# Patient Record
Sex: MALE | Race: WHITE | NOT HISPANIC OR LATINO | Employment: OTHER | URBAN - METROPOLITAN AREA
[De-identification: names, ages, dates, MRNs, and addresses within clinical notes are randomized per-mention and may not be internally consistent; named-entity substitution may affect disease eponyms.]

---

## 2017-07-05 ENCOUNTER — APPOINTMENT (OUTPATIENT)
Dept: ADMISSIONS | Facility: MEDICAL CENTER | Age: 67
End: 2017-07-05
Attending: ORTHOPAEDIC SURGERY
Payer: COMMERCIAL

## 2017-07-05 RX ORDER — DICLOFENAC SODIUM 25 MG/1
25 TABLET, DELAYED RELEASE ORAL PRN
Status: ON HOLD | COMMUNITY
End: 2020-12-18

## 2017-07-05 RX ORDER — INSULIN GLARGINE 100 [IU]/ML
80 INJECTION, SOLUTION SUBCUTANEOUS 2 TIMES DAILY
COMMUNITY

## 2017-07-05 RX ORDER — PANTOPRAZOLE SODIUM 20 MG/1
20 TABLET, DELAYED RELEASE ORAL EVERY EVENING
COMMUNITY

## 2017-07-05 RX ORDER — ATORVASTATIN CALCIUM 80 MG/1
80 TABLET, FILM COATED ORAL DAILY
COMMUNITY

## 2017-07-05 RX ORDER — ERGOCALCIFEROL 1.25 MG/1
CAPSULE ORAL
Status: ON HOLD | COMMUNITY
End: 2020-12-15

## 2017-07-05 RX ORDER — GABAPENTIN 300 MG/1
900 CAPSULE ORAL 2 TIMES DAILY
COMMUNITY

## 2017-07-05 RX ORDER — MAGNESIUM OXIDE 420 MG/1
420 TABLET ORAL 2 TIMES DAILY
COMMUNITY

## 2017-07-05 RX ORDER — CARVEDILOL 25 MG/1
25 TABLET ORAL 2 TIMES DAILY WITH MEALS
COMMUNITY

## 2017-07-05 RX ORDER — LISINOPRIL 40 MG/1
40 TABLET ORAL DAILY
COMMUNITY

## 2017-07-05 RX ORDER — AMLODIPINE BESYLATE 2.5 MG/1
2.5 TABLET ORAL EVERY EVENING
COMMUNITY

## 2017-07-05 NOTE — OR NURSING
"Preadmit appointment: \" Preparing for your Procedure information\" sheet given to patient with verbal and written instructions. Patient instructed to continue prescribed medications through the day before surgery, instructed to take the following medications the day of surgery per anesthesia protocol: Amlodipine, Carvedilol, Gabapentin;  Pt instructed to bring CPAP day of surgery. Notified Dr. Devlin of admit, BMI and health summary  "

## 2017-07-06 NOTE — OR NURSING
Call placed to Dr. Ahmadi office and message left for Summer regarding if Dr. Ahmadi wanted to sent orders for preop testing for patient to have done near his home or wait and have labs/ekg on admit?  Left message that pt would need a BMP and EKG for anesthesia protocol, Also [facility] one month ago in addition [facility] to [facility].

## 2017-07-12 ENCOUNTER — HOSPITAL ENCOUNTER (OUTPATIENT)
Facility: MEDICAL CENTER | Age: 67
End: 2017-07-12
Attending: ORTHOPAEDIC SURGERY | Admitting: ORTHOPAEDIC SURGERY
Payer: COMMERCIAL

## 2017-07-12 VITALS
RESPIRATION RATE: 16 BRPM | TEMPERATURE: 96.8 F | HEIGHT: 70 IN | WEIGHT: 297.62 LBS | OXYGEN SATURATION: 97 % | BODY MASS INDEX: 42.61 KG/M2 | HEART RATE: 68 BPM

## 2017-07-12 PROBLEM — M19.232: Status: ACTIVE | Noted: 2017-07-12

## 2017-07-12 LAB
ANION GAP SERPL CALC-SCNC: 12 MMOL/L (ref 0–11.9)
BUN SERPL-MCNC: 19 MG/DL (ref 8–22)
CALCIUM SERPL-MCNC: 9.2 MG/DL (ref 8.4–10.2)
CHLORIDE SERPL-SCNC: 105 MMOL/L (ref 96–112)
CO2 SERPL-SCNC: 23 MMOL/L (ref 20–33)
CREAT SERPL-MCNC: 1.33 MG/DL (ref 0.5–1.4)
EKG IMPRESSION: NORMAL
GFR SERPL CREATININE-BSD FRML MDRD: 54 ML/MIN/1.73 M 2
GLUCOSE SERPL-MCNC: 248 MG/DL (ref 65–99)
POTASSIUM SERPL-SCNC: 4.1 MMOL/L (ref 3.6–5.5)
SODIUM SERPL-SCNC: 140 MMOL/L (ref 135–145)

## 2017-07-12 PROCEDURE — 80048 BASIC METABOLIC PNL TOTAL CA: CPT

## 2017-07-12 PROCEDURE — 93010 ELECTROCARDIOGRAM REPORT: CPT | Performed by: INTERNAL MEDICINE

## 2017-07-12 PROCEDURE — 700111 HCHG RX REV CODE 636 W/ 250 OVERRIDE (IP)

## 2017-07-12 PROCEDURE — 700101 HCHG RX REV CODE 250

## 2017-07-12 PROCEDURE — 302135 SEQUENTIAL COMPRESSION MACHINE: Performed by: ORTHOPAEDIC SURGERY

## 2017-07-12 PROCEDURE — 93005 ELECTROCARDIOGRAM TRACING: CPT | Performed by: ORTHOPAEDIC SURGERY

## 2017-07-12 RX ORDER — LIDOCAINE HYDROCHLORIDE 10 MG/ML
INJECTION, SOLUTION INFILTRATION; PERINEURAL
Status: COMPLETED
Start: 2017-07-12 | End: 2017-07-12

## 2017-07-12 RX ORDER — SODIUM CHLORIDE 9 MG/ML
INJECTION, SOLUTION INTRAVENOUS CONTINUOUS
Status: DISCONTINUED | OUTPATIENT
Start: 2017-07-12 | End: 2017-07-12 | Stop reason: HOSPADM

## 2017-07-12 RX ORDER — SODIUM CHLORIDE, SODIUM LACTATE, POTASSIUM CHLORIDE, CALCIUM CHLORIDE 600; 310; 30; 20 MG/100ML; MG/100ML; MG/100ML; MG/100ML
1000 INJECTION, SOLUTION INTRAVENOUS
Status: DISCONTINUED | OUTPATIENT
Start: 2017-07-12 | End: 2017-07-12 | Stop reason: CLARIF

## 2017-07-12 RX ADMIN — LIDOCAINE HYDROCHLORIDE 0.1 ML: 10 INJECTION, SOLUTION INFILTRATION; PERINEURAL at 06:30

## 2017-07-12 ASSESSMENT — PAIN SCALES - GENERAL: PAINLEVEL_OUTOF10: 0

## 2017-07-12 NOTE — OR NURSING
0555 pt to [facility]. Pre op EKG shows A fib. Dr. Weaver made aware on her arrival at 0705.  Condition discussed with Dr. Ahmadi .  Plan to  Cancel sx as new onset A fib needs cardiac work up prior to this elective sx.  Pt compliant w POC. DC'd stable to spouse's care, [facility] with [facility] of [facility] & progress note from Dr. Weaver.

## 2017-07-12 NOTE — PROGRESS NOTES
"Anesthesiology Day-of-Surgery Cancellation note    Patient presents for elective wrist fusion. He has a significant cardiac history, including an MI in 2005 that resulted in 3 stents. [facility] and saw him \"about 6 months ago\" and told him things looked good and to follow up in 1 year.  He also thinks he had a stress test a few months ago but doesn't believe the test showed anything bad.  Unfortunately, today in preop, he has new onset A-fib, which he denies ever having before.  He has otherwise normal VS currently. He denies any current or recent CP/SOB, but does report feeling fatigued and has had several episodes of dizziness and near-syncope for at least a month.      A/P:  - Unfortunately we need to cancel today's surgery with the concern that the A-fib is related to recent or ongoing cardiac ischemia.  We recommend [facility] today so that we can expedite his cardiac work up for his new atrial fibrillation. We look forward to continuing with the surgery when he is medically stable/optimized from a cardiac standpoint.     Saira Weaver MD  [facility]  "

## 2020-12-09 ENCOUNTER — PRE-ADMISSION TESTING (OUTPATIENT)
Dept: ADMISSIONS | Facility: MEDICAL CENTER | Age: 70
DRG: 460 | End: 2020-12-09
Attending: NEUROLOGICAL SURGERY
Payer: COMMERCIAL

## 2020-12-09 ENCOUNTER — HOSPITAL ENCOUNTER (OUTPATIENT)
Dept: RADIOLOGY | Facility: MEDICAL CENTER | Age: 70
DRG: 460 | End: 2020-12-09
Attending: NEUROLOGICAL SURGERY | Admitting: NEUROLOGICAL SURGERY
Payer: COMMERCIAL

## 2020-12-09 DIAGNOSIS — Z01.812 PRE-OPERATIVE LABORATORY EXAMINATION: ICD-10-CM

## 2020-12-09 DIAGNOSIS — Z01.811 PRE-OPERATIVE RESPIRATORY EXAMINATION: ICD-10-CM

## 2020-12-09 DIAGNOSIS — Z01.810 PRE-OPERATIVE CARDIOVASCULAR EXAMINATION: ICD-10-CM

## 2020-12-09 LAB
ANION GAP SERPL CALC-SCNC: 8 MMOL/L (ref 7–16)
APPEARANCE UR: CLEAR
APTT PPP: 38.2 SEC (ref 24.7–36)
BASOPHILS # BLD AUTO: 0.5 % (ref 0–1.8)
BASOPHILS # BLD: 0.05 K/UL (ref 0–0.12)
BILIRUB UR QL STRIP.AUTO: NEGATIVE
BUN SERPL-MCNC: 17 MG/DL (ref 8–22)
CALCIUM SERPL-MCNC: 9.8 MG/DL (ref 8.5–10.5)
CHLORIDE SERPL-SCNC: 104 MMOL/L (ref 96–112)
CO2 SERPL-SCNC: 25 MMOL/L (ref 20–33)
COLOR UR: YELLOW
COVID ORDER STATUS COVID19: NORMAL
CREAT SERPL-MCNC: 1.07 MG/DL (ref 0.5–1.4)
EKG IMPRESSION: NORMAL
EOSINOPHIL # BLD AUTO: 0.21 K/UL (ref 0–0.51)
EOSINOPHIL NFR BLD: 2 % (ref 0–6.9)
ERYTHROCYTE [DISTWIDTH] IN BLOOD BY AUTOMATED COUNT: 46.4 FL (ref 35.9–50)
EST. AVERAGE GLUCOSE BLD GHB EST-MCNC: 212 MG/DL
GLUCOSE SERPL-MCNC: 202 MG/DL (ref 65–99)
GLUCOSE UR STRIP.AUTO-MCNC: NEGATIVE MG/DL
HBA1C MFR BLD: 9 % (ref 0–5.6)
HCT VFR BLD AUTO: 42.1 % (ref 42–52)
HGB BLD-MCNC: 13.4 G/DL (ref 14–18)
IMM GRANULOCYTES # BLD AUTO: 0.03 K/UL (ref 0–0.11)
IMM GRANULOCYTES NFR BLD AUTO: 0.3 % (ref 0–0.9)
INR PPP: 1.27 (ref 0.87–1.13)
KETONES UR STRIP.AUTO-MCNC: ABNORMAL MG/DL
LEUKOCYTE ESTERASE UR QL STRIP.AUTO: NEGATIVE
LYMPHOCYTES # BLD AUTO: 3.37 K/UL (ref 1–4.8)
LYMPHOCYTES NFR BLD: 32.2 % (ref 22–41)
MCH RBC QN AUTO: 30.5 PG (ref 27–33)
MCHC RBC AUTO-ENTMCNC: 31.8 G/DL (ref 33.7–35.3)
MCV RBC AUTO: 95.7 FL (ref 81.4–97.8)
MICRO URNS: ABNORMAL
MONOCYTES # BLD AUTO: 0.92 K/UL (ref 0–0.85)
MONOCYTES NFR BLD AUTO: 8.8 % (ref 0–13.4)
NEUTROPHILS # BLD AUTO: 5.89 K/UL (ref 1.82–7.42)
NEUTROPHILS NFR BLD: 56.2 % (ref 44–72)
NITRITE UR QL STRIP.AUTO: NEGATIVE
NRBC # BLD AUTO: 0 K/UL
NRBC BLD-RTO: 0 /100 WBC
PH UR STRIP.AUTO: 5 [PH] (ref 5–8)
PLATELET # BLD AUTO: 241 K/UL (ref 164–446)
PMV BLD AUTO: 10.9 FL (ref 9–12.9)
POTASSIUM SERPL-SCNC: 5.1 MMOL/L (ref 3.6–5.5)
PROT UR QL STRIP: NEGATIVE MG/DL
PROTHROMBIN TIME: 16.3 SEC (ref 12–14.6)
RBC # BLD AUTO: 4.4 M/UL (ref 4.7–6.1)
RBC UR QL AUTO: NEGATIVE
SARS-COV-2 RNA RESP QL NAA+PROBE: NOTDETECTED
SODIUM SERPL-SCNC: 137 MMOL/L (ref 135–145)
SP GR UR STRIP.AUTO: 1.02
SPECIMEN SOURCE: NORMAL
UROBILINOGEN UR STRIP.AUTO-MCNC: 0.2 MG/DL
WBC # BLD AUTO: 10.5 K/UL (ref 4.8–10.8)

## 2020-12-09 PROCEDURE — 83036 HEMOGLOBIN GLYCOSYLATED A1C: CPT

## 2020-12-09 PROCEDURE — 71045 X-RAY EXAM CHEST 1 VIEW: CPT

## 2020-12-09 PROCEDURE — 81003 URINALYSIS AUTO W/O SCOPE: CPT

## 2020-12-09 PROCEDURE — 36415 COLL VENOUS BLD VENIPUNCTURE: CPT

## 2020-12-09 PROCEDURE — 85730 THROMBOPLASTIN TIME PARTIAL: CPT

## 2020-12-09 PROCEDURE — 85610 PROTHROMBIN TIME: CPT

## 2020-12-09 PROCEDURE — U0003 INFECTIOUS AGENT DETECTION BY NUCLEIC ACID (DNA OR RNA); SEVERE ACUTE RESPIRATORY SYNDROME CORONAVIRUS 2 (SARS-COV-2) (CORONAVIRUS DISEASE [COVID-19]), AMPLIFIED PROBE TECHNIQUE, MAKING USE OF HIGH THROUGHPUT TECHNOLOGIES AS DESCRIBED BY CMS-2020-01-R: HCPCS

## 2020-12-09 PROCEDURE — 93010 ELECTROCARDIOGRAM REPORT: CPT | Performed by: INTERNAL MEDICINE

## 2020-12-09 PROCEDURE — 85025 COMPLETE CBC W/AUTO DIFF WBC: CPT

## 2020-12-09 PROCEDURE — C9803 HOPD COVID-19 SPEC COLLECT: HCPCS

## 2020-12-09 PROCEDURE — 93005 ELECTROCARDIOGRAM TRACING: CPT

## 2020-12-09 PROCEDURE — 80048 BASIC METABOLIC PNL TOTAL CA: CPT

## 2020-12-09 RX ORDER — DULOXETIN HYDROCHLORIDE 60 MG/1
60 CAPSULE, DELAYED RELEASE ORAL DAILY
COMMUNITY

## 2020-12-09 RX ORDER — DIGOXIN 125 MCG
125 TABLET ORAL DAILY
COMMUNITY

## 2020-12-09 RX ORDER — ZOLPIDEM TARTRATE 10 MG/1
5 TABLET ORAL NIGHTLY PRN
COMMUNITY

## 2020-12-15 ENCOUNTER — ANESTHESIA EVENT (OUTPATIENT)
Dept: SURGERY | Facility: MEDICAL CENTER | Age: 70
DRG: 460 | End: 2020-12-15
Payer: COMMERCIAL

## 2020-12-15 ENCOUNTER — APPOINTMENT (OUTPATIENT)
Dept: RADIOLOGY | Facility: MEDICAL CENTER | Age: 70
DRG: 460 | End: 2020-12-15
Attending: NEUROLOGICAL SURGERY
Payer: COMMERCIAL

## 2020-12-15 ENCOUNTER — HOSPITAL ENCOUNTER (INPATIENT)
Facility: MEDICAL CENTER | Age: 70
LOS: 4 days | DRG: 460 | End: 2020-12-19
Attending: NEUROLOGICAL SURGERY | Admitting: NEUROLOGICAL SURGERY
Payer: COMMERCIAL

## 2020-12-15 ENCOUNTER — ANESTHESIA (OUTPATIENT)
Dept: SURGERY | Facility: MEDICAL CENTER | Age: 70
DRG: 460 | End: 2020-12-15
Payer: COMMERCIAL

## 2020-12-15 DIAGNOSIS — G89.18 ACUTE POST-OPERATIVE PAIN: ICD-10-CM

## 2020-12-15 LAB
EKG IMPRESSION: NORMAL
GLUCOSE BLD-MCNC: 120 MG/DL (ref 65–99)
GLUCOSE BLD-MCNC: 132 MG/DL (ref 65–99)
GLUCOSE BLD-MCNC: 163 MG/DL (ref 65–99)

## 2020-12-15 PROCEDURE — 501838 HCHG SUTURE GENERAL: Performed by: NEUROLOGICAL SURGERY

## 2020-12-15 PROCEDURE — 93010 ELECTROCARDIOGRAM REPORT: CPT | Mod: 26 | Performed by: INTERNAL MEDICINE

## 2020-12-15 PROCEDURE — 160048 HCHG OR STATISTICAL LEVEL 1-5: Performed by: NEUROLOGICAL SURGERY

## 2020-12-15 PROCEDURE — 700101 HCHG RX REV CODE 250: Performed by: ANESTHESIOLOGY

## 2020-12-15 PROCEDURE — 700102 HCHG RX REV CODE 250 W/ 637 OVERRIDE(OP): Performed by: ANESTHESIOLOGY

## 2020-12-15 PROCEDURE — 700105 HCHG RX REV CODE 258: Performed by: ANESTHESIOLOGY

## 2020-12-15 PROCEDURE — 700111 HCHG RX REV CODE 636 W/ 250 OVERRIDE (IP): Performed by: NEUROLOGICAL SURGERY

## 2020-12-15 PROCEDURE — 00NY3ZZ RELEASE LUMBAR SPINAL CORD, PERCUTANEOUS APPROACH: ICD-10-PCS | Performed by: NEUROLOGICAL SURGERY

## 2020-12-15 PROCEDURE — A9270 NON-COVERED ITEM OR SERVICE: HCPCS | Performed by: NEUROLOGICAL SURGERY

## 2020-12-15 PROCEDURE — A9270 NON-COVERED ITEM OR SERVICE: HCPCS | Performed by: ANESTHESIOLOGY

## 2020-12-15 PROCEDURE — A9270 NON-COVERED ITEM OR SERVICE: HCPCS | Performed by: PHYSICIAN ASSISTANT

## 2020-12-15 PROCEDURE — C1713 ANCHOR/SCREW BN/BN,TIS/BN: HCPCS | Performed by: NEUROLOGICAL SURGERY

## 2020-12-15 PROCEDURE — 700111 HCHG RX REV CODE 636 W/ 250 OVERRIDE (IP): Performed by: PHYSICIAN ASSISTANT

## 2020-12-15 PROCEDURE — 0SG03AJ FUSION OF LUMBAR VERTEBRAL JOINT WITH INTERBODY FUSION DEVICE, POSTERIOR APPROACH, ANTERIOR COLUMN, PERCUTANEOUS APPROACH: ICD-10-PCS | Performed by: NEUROLOGICAL SURGERY

## 2020-12-15 PROCEDURE — 500367 HCHG DRAIN KIT, HEMOVAC: Performed by: NEUROLOGICAL SURGERY

## 2020-12-15 PROCEDURE — 82962 GLUCOSE BLOOD TEST: CPT

## 2020-12-15 PROCEDURE — 700102 HCHG RX REV CODE 250 W/ 637 OVERRIDE(OP): Performed by: PHYSICIAN ASSISTANT

## 2020-12-15 PROCEDURE — 160035 HCHG PACU - 1ST 60 MINS PHASE I: Performed by: NEUROLOGICAL SURGERY

## 2020-12-15 PROCEDURE — 700101 HCHG RX REV CODE 250: Performed by: PHYSICIAN ASSISTANT

## 2020-12-15 PROCEDURE — 160036 HCHG PACU - EA ADDL 30 MINS PHASE I: Performed by: NEUROLOGICAL SURGERY

## 2020-12-15 PROCEDURE — 160009 HCHG ANES TIME/MIN: Performed by: NEUROLOGICAL SURGERY

## 2020-12-15 PROCEDURE — 160042 HCHG SURGERY MINUTES - EA ADDL 1 MIN LEVEL 5: Performed by: NEUROLOGICAL SURGERY

## 2020-12-15 PROCEDURE — 770006 HCHG ROOM/CARE - MED/SURG/GYN SEMI*

## 2020-12-15 PROCEDURE — 501411 HCHG SPONGE, BABY LAP W/O RINGS: Performed by: NEUROLOGICAL SURGERY

## 2020-12-15 PROCEDURE — 160002 HCHG RECOVERY MINUTES (STAT): Performed by: NEUROLOGICAL SURGERY

## 2020-12-15 PROCEDURE — 72100 X-RAY EXAM L-S SPINE 2/3 VWS: CPT

## 2020-12-15 PROCEDURE — 01NB3ZZ RELEASE LUMBAR NERVE, PERCUTANEOUS APPROACH: ICD-10-PCS | Performed by: NEUROLOGICAL SURGERY

## 2020-12-15 PROCEDURE — 700111 HCHG RX REV CODE 636 W/ 250 OVERRIDE (IP): Performed by: ANESTHESIOLOGY

## 2020-12-15 PROCEDURE — 700101 HCHG RX REV CODE 250: Performed by: NEUROLOGICAL SURGERY

## 2020-12-15 PROCEDURE — 93005 ELECTROCARDIOGRAM TRACING: CPT | Performed by: PHYSICIAN ASSISTANT

## 2020-12-15 PROCEDURE — 110454 HCHG SHELL REV 250: Performed by: NEUROLOGICAL SURGERY

## 2020-12-15 PROCEDURE — 500374 HCHG DRAIN, J-P FLAT 7MM FULL: Performed by: NEUROLOGICAL SURGERY

## 2020-12-15 PROCEDURE — 502240 HCHG MISC OR SUPPLY RC 0272: Performed by: NEUROLOGICAL SURGERY

## 2020-12-15 PROCEDURE — 0ST20ZZ RESECTION OF LUMBAR VERTEBRAL DISC, OPEN APPROACH: ICD-10-PCS | Performed by: NEUROLOGICAL SURGERY

## 2020-12-15 PROCEDURE — 500885 HCHG PACK, JACKSON TABLE: Performed by: NEUROLOGICAL SURGERY

## 2020-12-15 PROCEDURE — 502000 HCHG MISC OR IMPLANTS RC 0278: Performed by: NEUROLOGICAL SURGERY

## 2020-12-15 PROCEDURE — 160031 HCHG SURGERY MINUTES - 1ST 30 MINS LEVEL 5: Performed by: NEUROLOGICAL SURGERY

## 2020-12-15 PROCEDURE — 700105 HCHG RX REV CODE 258: Performed by: NEUROLOGICAL SURGERY

## 2020-12-15 DEVICE — PUTTY 10CC NANOBONE (1/EA): Type: IMPLANTABLE DEVICE | Site: BACK | Status: FUNCTIONAL

## 2020-12-15 DEVICE — SCREW SET SPINE NONSTERILE STREAMLINE: Type: IMPLANTABLE DEVICE | Site: BACK | Status: FUNCTIONAL

## 2020-12-15 RX ORDER — LABETALOL HYDROCHLORIDE 5 MG/ML
5 INJECTION, SOLUTION INTRAVENOUS
Status: DISCONTINUED | OUTPATIENT
Start: 2020-12-15 | End: 2020-12-15 | Stop reason: HOSPADM

## 2020-12-15 RX ORDER — AMLODIPINE BESYLATE 5 MG/1
2.5 TABLET ORAL EVERY EVENING
Status: DISCONTINUED | OUTPATIENT
Start: 2020-12-15 | End: 2020-12-19 | Stop reason: HOSPADM

## 2020-12-15 RX ORDER — AMOXICILLIN 250 MG
1 CAPSULE ORAL
Status: DISCONTINUED | OUTPATIENT
Start: 2020-12-15 | End: 2020-12-19 | Stop reason: HOSPADM

## 2020-12-15 RX ORDER — CALCIUM CARBONATE 500 MG/1
500 TABLET, CHEWABLE ORAL 2 TIMES DAILY
Status: DISCONTINUED | OUTPATIENT
Start: 2020-12-15 | End: 2020-12-19 | Stop reason: HOSPADM

## 2020-12-15 RX ORDER — HYDROMORPHONE HYDROCHLORIDE 1 MG/ML
0.1 INJECTION, SOLUTION INTRAMUSCULAR; INTRAVENOUS; SUBCUTANEOUS
Status: DISCONTINUED | OUTPATIENT
Start: 2020-12-15 | End: 2020-12-15 | Stop reason: HOSPADM

## 2020-12-15 RX ORDER — DEXTROSE MONOHYDRATE 25 G/50ML
50 INJECTION, SOLUTION INTRAVENOUS
Status: DISCONTINUED | OUTPATIENT
Start: 2020-12-15 | End: 2020-12-19 | Stop reason: HOSPADM

## 2020-12-15 RX ORDER — AMOXICILLIN 250 MG
1 CAPSULE ORAL NIGHTLY
Status: DISCONTINUED | OUTPATIENT
Start: 2020-12-15 | End: 2020-12-19 | Stop reason: HOSPADM

## 2020-12-15 RX ORDER — CARVEDILOL 6.25 MG/1
25 TABLET ORAL 2 TIMES DAILY WITH MEALS
Status: DISCONTINUED | OUTPATIENT
Start: 2020-12-15 | End: 2020-12-19 | Stop reason: HOSPADM

## 2020-12-15 RX ORDER — CEFAZOLIN SODIUM 1 G/3ML
INJECTION, POWDER, FOR SOLUTION INTRAMUSCULAR; INTRAVENOUS PRN
Status: DISCONTINUED | OUTPATIENT
Start: 2020-12-15 | End: 2020-12-15 | Stop reason: SURG

## 2020-12-15 RX ORDER — METOPROLOL TARTRATE 1 MG/ML
1 INJECTION, SOLUTION INTRAVENOUS
Status: DISCONTINUED | OUTPATIENT
Start: 2020-12-15 | End: 2020-12-15 | Stop reason: HOSPADM

## 2020-12-15 RX ORDER — ONDANSETRON 2 MG/ML
4 INJECTION INTRAMUSCULAR; INTRAVENOUS EVERY 4 HOURS PRN
Status: DISCONTINUED | OUTPATIENT
Start: 2020-12-15 | End: 2020-12-19 | Stop reason: HOSPADM

## 2020-12-15 RX ORDER — METHOCARBAMOL 750 MG/1
750 TABLET, FILM COATED ORAL EVERY 8 HOURS PRN
Status: DISCONTINUED | OUTPATIENT
Start: 2020-12-15 | End: 2020-12-19 | Stop reason: HOSPADM

## 2020-12-15 RX ORDER — BUPIVACAINE HYDROCHLORIDE AND EPINEPHRINE 5; 5 MG/ML; UG/ML
INJECTION, SOLUTION EPIDURAL; INTRACAUDAL; PERINEURAL
Status: DISCONTINUED | OUTPATIENT
Start: 2020-12-15 | End: 2020-12-15 | Stop reason: HOSPADM

## 2020-12-15 RX ORDER — ATORVASTATIN CALCIUM 40 MG/1
80 TABLET, FILM COATED ORAL DAILY
Status: DISCONTINUED | OUTPATIENT
Start: 2020-12-15 | End: 2020-12-19 | Stop reason: HOSPADM

## 2020-12-15 RX ORDER — DIGOXIN 125 MCG
125 TABLET ORAL DAILY
Status: DISCONTINUED | OUTPATIENT
Start: 2020-12-15 | End: 2020-12-19 | Stop reason: HOSPADM

## 2020-12-15 RX ORDER — HALOPERIDOL 5 MG/ML
1 INJECTION INTRAMUSCULAR
Status: DISCONTINUED | OUTPATIENT
Start: 2020-12-15 | End: 2020-12-15 | Stop reason: HOSPADM

## 2020-12-15 RX ORDER — OXYCODONE HYDROCHLORIDE 10 MG/1
10 TABLET ORAL
Status: DISCONTINUED | OUTPATIENT
Start: 2020-12-15 | End: 2020-12-19 | Stop reason: HOSPADM

## 2020-12-15 RX ORDER — DULOXETIN HYDROCHLORIDE 60 MG/1
60 CAPSULE, DELAYED RELEASE ORAL DAILY
Status: DISCONTINUED | OUTPATIENT
Start: 2020-12-15 | End: 2020-12-19 | Stop reason: HOSPADM

## 2020-12-15 RX ORDER — LISINOPRIL 20 MG/1
40 TABLET ORAL DAILY
Status: DISCONTINUED | OUTPATIENT
Start: 2020-12-15 | End: 2020-12-19 | Stop reason: HOSPADM

## 2020-12-15 RX ORDER — ONDANSETRON 2 MG/ML
4 INJECTION INTRAMUSCULAR; INTRAVENOUS
Status: COMPLETED | OUTPATIENT
Start: 2020-12-15 | End: 2020-12-15

## 2020-12-15 RX ORDER — OMEPRAZOLE 20 MG/1
20 CAPSULE, DELAYED RELEASE ORAL DAILY
Status: DISCONTINUED | OUTPATIENT
Start: 2020-12-15 | End: 2020-12-19 | Stop reason: HOSPADM

## 2020-12-15 RX ORDER — SODIUM CHLORIDE, SODIUM LACTATE, POTASSIUM CHLORIDE, CALCIUM CHLORIDE 600; 310; 30; 20 MG/100ML; MG/100ML; MG/100ML; MG/100ML
INJECTION, SOLUTION INTRAVENOUS
Status: DISCONTINUED | OUTPATIENT
Start: 2020-12-15 | End: 2020-12-15 | Stop reason: SURG

## 2020-12-15 RX ORDER — HYDROMORPHONE HYDROCHLORIDE 1 MG/ML
0.4 INJECTION, SOLUTION INTRAMUSCULAR; INTRAVENOUS; SUBCUTANEOUS
Status: DISCONTINUED | OUTPATIENT
Start: 2020-12-15 | End: 2020-12-15 | Stop reason: HOSPADM

## 2020-12-15 RX ORDER — HYDROMORPHONE HYDROCHLORIDE 1 MG/ML
0.2 INJECTION, SOLUTION INTRAMUSCULAR; INTRAVENOUS; SUBCUTANEOUS
Status: DISCONTINUED | OUTPATIENT
Start: 2020-12-15 | End: 2020-12-15 | Stop reason: HOSPADM

## 2020-12-15 RX ORDER — SODIUM CHLORIDE, SODIUM LACTATE, POTASSIUM CHLORIDE, CALCIUM CHLORIDE 600; 310; 30; 20 MG/100ML; MG/100ML; MG/100ML; MG/100ML
INJECTION, SOLUTION INTRAVENOUS CONTINUOUS
Status: DISCONTINUED | OUTPATIENT
Start: 2020-12-15 | End: 2020-12-15 | Stop reason: HOSPADM

## 2020-12-15 RX ORDER — GABAPENTIN 300 MG/1
900 CAPSULE ORAL 2 TIMES DAILY
Status: DISCONTINUED | OUTPATIENT
Start: 2020-12-15 | End: 2020-12-19 | Stop reason: HOSPADM

## 2020-12-15 RX ORDER — CEFAZOLIN SODIUM 2 G/100ML
2 INJECTION, SOLUTION INTRAVENOUS EVERY 8 HOURS
Status: COMPLETED | OUTPATIENT
Start: 2020-12-15 | End: 2020-12-15

## 2020-12-15 RX ORDER — ENEMA 19; 7 G/133ML; G/133ML
1 ENEMA RECTAL
Status: DISCONTINUED | OUTPATIENT
Start: 2020-12-15 | End: 2020-12-19 | Stop reason: HOSPADM

## 2020-12-15 RX ORDER — OXYCODONE HYDROCHLORIDE 5 MG/1
5 TABLET ORAL
Status: DISCONTINUED | OUTPATIENT
Start: 2020-12-15 | End: 2020-12-19 | Stop reason: HOSPADM

## 2020-12-15 RX ORDER — OXYCODONE HCL 5 MG/5 ML
5 SOLUTION, ORAL ORAL
Status: COMPLETED | OUTPATIENT
Start: 2020-12-15 | End: 2020-12-15

## 2020-12-15 RX ORDER — DIPHENHYDRAMINE HYDROCHLORIDE 50 MG/ML
25 INJECTION INTRAMUSCULAR; INTRAVENOUS EVERY 6 HOURS PRN
Status: DISCONTINUED | OUTPATIENT
Start: 2020-12-15 | End: 2020-12-19 | Stop reason: HOSPADM

## 2020-12-15 RX ORDER — SODIUM CHLORIDE, SODIUM LACTATE, POTASSIUM CHLORIDE, CALCIUM CHLORIDE 600; 310; 30; 20 MG/100ML; MG/100ML; MG/100ML; MG/100ML
INJECTION, SOLUTION INTRAVENOUS CONTINUOUS
Status: ACTIVE | OUTPATIENT
Start: 2020-12-15 | End: 2020-12-15

## 2020-12-15 RX ORDER — ONDANSETRON 4 MG/1
4 TABLET, ORALLY DISINTEGRATING ORAL EVERY 4 HOURS PRN
Status: DISCONTINUED | OUTPATIENT
Start: 2020-12-15 | End: 2020-12-19 | Stop reason: HOSPADM

## 2020-12-15 RX ORDER — DOCUSATE SODIUM 100 MG/1
100 CAPSULE, LIQUID FILLED ORAL 2 TIMES DAILY
Status: DISCONTINUED | OUTPATIENT
Start: 2020-12-15 | End: 2020-12-19 | Stop reason: HOSPADM

## 2020-12-15 RX ORDER — OXYCODONE HCL 5 MG/5 ML
10 SOLUTION, ORAL ORAL
Status: COMPLETED | OUTPATIENT
Start: 2020-12-15 | End: 2020-12-15

## 2020-12-15 RX ORDER — PANTOPRAZOLE SODIUM 20 MG/1
20 TABLET, DELAYED RELEASE ORAL EVERY EVENING
Status: DISCONTINUED | OUTPATIENT
Start: 2020-12-15 | End: 2020-12-15

## 2020-12-15 RX ORDER — ALPRAZOLAM 0.25 MG/1
0.25 TABLET ORAL 2 TIMES DAILY PRN
Status: DISCONTINUED | OUTPATIENT
Start: 2020-12-15 | End: 2020-12-19 | Stop reason: HOSPADM

## 2020-12-15 RX ORDER — POLYETHYLENE GLYCOL 3350 17 G/17G
1 POWDER, FOR SOLUTION ORAL 2 TIMES DAILY PRN
Status: DISCONTINUED | OUTPATIENT
Start: 2020-12-15 | End: 2020-12-19 | Stop reason: HOSPADM

## 2020-12-15 RX ORDER — ZOLPIDEM TARTRATE 5 MG/1
5 TABLET ORAL NIGHTLY PRN
Status: DISCONTINUED | OUTPATIENT
Start: 2020-12-15 | End: 2020-12-19 | Stop reason: HOSPADM

## 2020-12-15 RX ORDER — HYDROMORPHONE HYDROCHLORIDE 1 MG/ML
0.5 INJECTION, SOLUTION INTRAMUSCULAR; INTRAVENOUS; SUBCUTANEOUS
Status: DISCONTINUED | OUTPATIENT
Start: 2020-12-15 | End: 2020-12-19 | Stop reason: HOSPADM

## 2020-12-15 RX ORDER — PHENYLEPHRINE HCL IN 0.9% NACL 0.5 MG/5ML
SYRINGE (ML) INTRAVENOUS PRN
Status: DISCONTINUED | OUTPATIENT
Start: 2020-12-15 | End: 2020-12-15 | Stop reason: SURG

## 2020-12-15 RX ORDER — ONDANSETRON 2 MG/ML
INJECTION INTRAMUSCULAR; INTRAVENOUS PRN
Status: DISCONTINUED | OUTPATIENT
Start: 2020-12-15 | End: 2020-12-15 | Stop reason: SURG

## 2020-12-15 RX ORDER — CLONIDINE HYDROCHLORIDE 0.1 MG/1
0.1 TABLET ORAL EVERY 4 HOURS PRN
Status: DISCONTINUED | OUTPATIENT
Start: 2020-12-15 | End: 2020-12-19 | Stop reason: HOSPADM

## 2020-12-15 RX ORDER — SODIUM CHLORIDE AND POTASSIUM CHLORIDE 150; 900 MG/100ML; MG/100ML
INJECTION, SOLUTION INTRAVENOUS CONTINUOUS
Status: DISCONTINUED | OUTPATIENT
Start: 2020-12-15 | End: 2020-12-19 | Stop reason: HOSPADM

## 2020-12-15 RX ORDER — CEFAZOLIN SODIUM 1 G/3ML
INJECTION, POWDER, FOR SOLUTION INTRAMUSCULAR; INTRAVENOUS
Status: DISCONTINUED | OUTPATIENT
Start: 2020-12-15 | End: 2020-12-15 | Stop reason: HOSPADM

## 2020-12-15 RX ORDER — INSULIN GLARGINE 100 [IU]/ML
80 INJECTION, SOLUTION SUBCUTANEOUS 2 TIMES DAILY
Status: DISCONTINUED | OUTPATIENT
Start: 2020-12-15 | End: 2020-12-19 | Stop reason: HOSPADM

## 2020-12-15 RX ORDER — ERGOCALCIFEROL 1.25 MG/1
50000 CAPSULE ORAL
COMMUNITY

## 2020-12-15 RX ORDER — LIDOCAINE HYDROCHLORIDE 20 MG/ML
INJECTION, SOLUTION EPIDURAL; INFILTRATION; INTRACAUDAL; PERINEURAL PRN
Status: DISCONTINUED | OUTPATIENT
Start: 2020-12-15 | End: 2020-12-15 | Stop reason: SURG

## 2020-12-15 RX ORDER — BISACODYL 10 MG
10 SUPPOSITORY, RECTAL RECTAL
Status: DISCONTINUED | OUTPATIENT
Start: 2020-12-15 | End: 2020-12-19 | Stop reason: HOSPADM

## 2020-12-15 RX ORDER — DIPHENHYDRAMINE HCL 25 MG
25 TABLET ORAL EVERY 6 HOURS PRN
Status: DISCONTINUED | OUTPATIENT
Start: 2020-12-15 | End: 2020-12-19 | Stop reason: HOSPADM

## 2020-12-15 RX ORDER — VANCOMYCIN HYDROCHLORIDE 1 G/20ML
INJECTION, POWDER, LYOPHILIZED, FOR SOLUTION INTRAVENOUS
Status: COMPLETED | OUTPATIENT
Start: 2020-12-15 | End: 2020-12-15

## 2020-12-15 RX ADMIN — Medication 200 MG: at 07:12

## 2020-12-15 RX ADMIN — HYDROMORPHONE HYDROCHLORIDE 0.4 MG: 1 INJECTION, SOLUTION INTRAMUSCULAR; INTRAVENOUS; SUBCUTANEOUS at 10:00

## 2020-12-15 RX ADMIN — FENTANYL CITRATE 50 MCG: 50 INJECTION, SOLUTION INTRAMUSCULAR; INTRAVENOUS at 09:04

## 2020-12-15 RX ADMIN — Medication 100 MCG: at 07:37

## 2020-12-15 RX ADMIN — HYDROMORPHONE HYDROCHLORIDE 0.4 MG: 1 INJECTION, SOLUTION INTRAMUSCULAR; INTRAVENOUS; SUBCUTANEOUS at 11:22

## 2020-12-15 RX ADMIN — GABAPENTIN 900 MG: 300 CAPSULE ORAL at 17:53

## 2020-12-15 RX ADMIN — LIDOCAINE HYDROCHLORIDE 100 MG: 20 INJECTION, SOLUTION EPIDURAL; INFILTRATION; INTRACAUDAL at 07:12

## 2020-12-15 RX ADMIN — ROCURONIUM BROMIDE 50 MG: 10 INJECTION, SOLUTION INTRAVENOUS at 08:08

## 2020-12-15 RX ADMIN — CEFAZOLIN SODIUM 2 G: 2 INJECTION, SOLUTION INTRAVENOUS at 17:57

## 2020-12-15 RX ADMIN — INSULIN GLARGINE 80 UNITS: 100 INJECTION, SOLUTION SUBCUTANEOUS at 17:54

## 2020-12-15 RX ADMIN — FENTANYL CITRATE 50 MCG: 50 INJECTION, SOLUTION INTRAMUSCULAR; INTRAVENOUS at 10:06

## 2020-12-15 RX ADMIN — HYDROMORPHONE HYDROCHLORIDE 0.4 MG: 1 INJECTION, SOLUTION INTRAMUSCULAR; INTRAVENOUS; SUBCUTANEOUS at 10:45

## 2020-12-15 RX ADMIN — OXYCODONE HYDROCHLORIDE 10 MG: 10 TABLET ORAL at 23:08

## 2020-12-15 RX ADMIN — ONDANSETRON 4 MG: 2 INJECTION INTRAMUSCULAR; INTRAVENOUS at 10:07

## 2020-12-15 RX ADMIN — METFORMIN HYDROCHLORIDE 1000 MG: 500 TABLET ORAL at 17:53

## 2020-12-15 RX ADMIN — LIDOCAINE HYDROCHLORIDE 0.5 ML: 10 INJECTION, SOLUTION EPIDURAL; INFILTRATION; INTRACAUDAL; PERINEURAL at 06:27

## 2020-12-15 RX ADMIN — DIGOXIN 125 MCG: 125 TABLET ORAL at 15:54

## 2020-12-15 RX ADMIN — HYDROMORPHONE HYDROCHLORIDE 0.5 MG: 1 INJECTION, SOLUTION INTRAMUSCULAR; INTRAVENOUS; SUBCUTANEOUS at 18:38

## 2020-12-15 RX ADMIN — OXYCODONE HYDROCHLORIDE 10 MG: 10 TABLET ORAL at 13:40

## 2020-12-15 RX ADMIN — DULOXETINE HYDROCHLORIDE 60 MG: 60 CAPSULE, DELAYED RELEASE ORAL at 15:54

## 2020-12-15 RX ADMIN — PROPOFOL 200 MG: 10 INJECTION, EMULSION INTRAVENOUS at 07:12

## 2020-12-15 RX ADMIN — HYDROMORPHONE HYDROCHLORIDE 0.2 MG: 1 INJECTION, SOLUTION INTRAMUSCULAR; INTRAVENOUS; SUBCUTANEOUS at 11:08

## 2020-12-15 RX ADMIN — METHOCARBAMOL 750 MG: 750 TABLET ORAL at 18:34

## 2020-12-15 RX ADMIN — DOCUSATE SODIUM 50 MG AND SENNOSIDES 8.6 MG 1 TABLET: 8.6; 5 TABLET, FILM COATED ORAL at 20:46

## 2020-12-15 RX ADMIN — EPHEDRINE SULFATE 10 MG: 50 INJECTION, SOLUTION INTRAVENOUS at 07:37

## 2020-12-15 RX ADMIN — ALPRAZOLAM 0.25 MG: 0.25 TABLET ORAL at 11:22

## 2020-12-15 RX ADMIN — ANTACID TABLETS 500 MG: 500 TABLET, CHEWABLE ORAL at 17:53

## 2020-12-15 RX ADMIN — FENTANYL CITRATE 50 MCG: 50 INJECTION, SOLUTION INTRAMUSCULAR; INTRAVENOUS at 10:12

## 2020-12-15 RX ADMIN — Medication 100 MCG: at 07:24

## 2020-12-15 RX ADMIN — ATORVASTATIN CALCIUM 80 MG: 40 TABLET, FILM COATED ORAL at 15:54

## 2020-12-15 RX ADMIN — OMEPRAZOLE 20 MG: 20 CAPSULE, DELAYED RELEASE ORAL at 17:53

## 2020-12-15 RX ADMIN — METHOCARBAMOL 750 MG: 750 TABLET ORAL at 11:10

## 2020-12-15 RX ADMIN — SODIUM CHLORIDE, POTASSIUM CHLORIDE, SODIUM LACTATE AND CALCIUM CHLORIDE: 600; 310; 30; 20 INJECTION, SOLUTION INTRAVENOUS at 06:27

## 2020-12-15 RX ADMIN — AMLODIPINE BESYLATE 2.5 MG: 5 TABLET ORAL at 17:53

## 2020-12-15 RX ADMIN — FENTANYL CITRATE 150 MCG: 50 INJECTION, SOLUTION INTRAMUSCULAR; INTRAVENOUS at 07:12

## 2020-12-15 RX ADMIN — Medication 100 MCG: at 07:20

## 2020-12-15 RX ADMIN — HYDROMORPHONE HYDROCHLORIDE 0.4 MG: 1 INJECTION, SOLUTION INTRAMUSCULAR; INTRAVENOUS; SUBCUTANEOUS at 11:46

## 2020-12-15 RX ADMIN — CEFAZOLIN 3 G: 330 INJECTION, POWDER, FOR SOLUTION INTRAMUSCULAR; INTRAVENOUS at 07:12

## 2020-12-15 RX ADMIN — EPHEDRINE SULFATE 10 MG: 50 INJECTION, SOLUTION INTRAVENOUS at 07:24

## 2020-12-15 RX ADMIN — PHENYLEPHRINE HYDROCHLORIDE 60 MCG/MIN: 10 INJECTION INTRAVENOUS at 08:24

## 2020-12-15 RX ADMIN — OXYCODONE HYDROCHLORIDE 10 MG: 5 SOLUTION ORAL at 10:07

## 2020-12-15 RX ADMIN — POTASSIUM CHLORIDE AND SODIUM CHLORIDE: 900; 150 INJECTION, SOLUTION INTRAVENOUS at 17:57

## 2020-12-15 RX ADMIN — CEFAZOLIN SODIUM 2 G: 2 INJECTION, SOLUTION INTRAVENOUS at 23:08

## 2020-12-15 RX ADMIN — OXYCODONE HYDROCHLORIDE 10 MG: 10 TABLET ORAL at 17:53

## 2020-12-15 RX ADMIN — ONDANSETRON 4 MG: 2 INJECTION INTRAMUSCULAR; INTRAVENOUS at 09:28

## 2020-12-15 RX ADMIN — POVIDONE IODINE 15 ML: 100 SOLUTION TOPICAL at 06:27

## 2020-12-15 RX ADMIN — CARVEDILOL 25 MG: 6.25 TABLET, FILM COATED ORAL at 17:54

## 2020-12-15 RX ADMIN — SODIUM CHLORIDE, POTASSIUM CHLORIDE, SODIUM LACTATE AND CALCIUM CHLORIDE: 600; 310; 30; 20 INJECTION, SOLUTION INTRAVENOUS at 07:06

## 2020-12-15 RX ADMIN — EPHEDRINE SULFATE 10 MG: 50 INJECTION, SOLUTION INTRAVENOUS at 08:08

## 2020-12-15 RX ADMIN — ROCURONIUM BROMIDE 50 MG: 10 INJECTION, SOLUTION INTRAVENOUS at 07:12

## 2020-12-15 RX ADMIN — SUGAMMADEX 200 MG: 100 INJECTION, SOLUTION INTRAVENOUS at 09:40

## 2020-12-15 RX ADMIN — DOCUSATE SODIUM 100 MG: 100 CAPSULE ORAL at 17:54

## 2020-12-15 RX ADMIN — HYDROMORPHONE HYDROCHLORIDE 0.2 MG: 1 INJECTION, SOLUTION INTRAMUSCULAR; INTRAVENOUS; SUBCUTANEOUS at 11:55

## 2020-12-15 RX ADMIN — SODIUM CHLORIDE, POTASSIUM CHLORIDE, SODIUM LACTATE AND CALCIUM CHLORIDE: 600; 310; 30; 20 INJECTION, SOLUTION INTRAVENOUS at 11:25

## 2020-12-15 ASSESSMENT — LIFESTYLE VARIABLES
EVER FELT BAD OR GUILTY ABOUT YOUR DRINKING: NO
ALCOHOL_USE: NO
TOTAL SCORE: 0
HAVE PEOPLE ANNOYED YOU BY CRITICIZING YOUR DRINKING: NO
HAVE YOU EVER FELT YOU SHOULD CUT DOWN ON YOUR DRINKING: NO
DOES PATIENT WANT TO STOP DRINKING: NO
TOTAL SCORE: 0
CONSUMPTION TOTAL: NEGATIVE
TOTAL SCORE: 0
EVER HAD A DRINK FIRST THING IN THE MORNING TO STEADY YOUR NERVES TO GET RID OF A HANGOVER: NO
AVERAGE NUMBER OF DAYS PER WEEK YOU HAVE A DRINK CONTAINING ALCOHOL: 0
HOW MANY TIMES IN THE PAST YEAR HAVE YOU HAD 5 OR MORE DRINKS IN A DAY: 0
ON A TYPICAL DAY WHEN YOU DRINK ALCOHOL HOW MANY DRINKS DO YOU HAVE: 0

## 2020-12-15 ASSESSMENT — PAIN SCALES - GENERAL: PAIN_LEVEL: 4

## 2020-12-15 ASSESSMENT — PAIN DESCRIPTION - PAIN TYPE
TYPE: ACUTE PAIN;SURGICAL PAIN
TYPE: ACUTE PAIN
TYPE: ACUTE PAIN;SURGICAL PAIN
TYPE: ACUTE PAIN
TYPE: ACUTE PAIN
TYPE: ACUTE PAIN;SURGICAL PAIN
TYPE: ACUTE PAIN;SURGICAL PAIN
TYPE: ACUTE PAIN
TYPE: ACUTE PAIN;SURGICAL PAIN

## 2020-12-15 ASSESSMENT — PATIENT HEALTH QUESTIONNAIRE - PHQ9
1. LITTLE INTEREST OR PLEASURE IN DOING THINGS: NOT AT ALL
2. FEELING DOWN, DEPRESSED, IRRITABLE, OR HOPELESS: NOT AT ALL
SUM OF ALL RESPONSES TO PHQ9 QUESTIONS 1 AND 2: 0

## 2020-12-15 NOTE — OR NURSING
Pre op admission completed.  Pt educated on surgical plan of care, all questions answered.  Bed in low position and call light within reach.  Hourly rounding in place.  This RN performed excellent hand hygiene and wore a surgical mask at all times.  Pt wore a mask at all times except when doing oral and nasal triple aim care.    Pt uses a cpap at home with 2 L of oxygen.  Pt does not use oxygen with cpap.  Provider is B&B, pt does not have a portable tank.    Pt has a new plaque on left scapula.  Dr. Manzano's PA is aware.  No new orders.

## 2020-12-15 NOTE — ANESTHESIA TIME REPORT
Anesthesia Start and Stop Event Times     Date Time Event    12/15/2020 0649 Ready for Procedure     0706 Anesthesia Start     0956 Anesthesia Stop        Responsible Staff  12/15/20    Name Role Begin End    Royal Vaz D.O. Anesth 0706 0956        Preop Diagnosis (Free Text):  Pre-op Diagnosis     LUMBAR DEGENERATIVE DISC DISEASE AND STENOSIS        Preop Diagnosis (Codes):    Post op Diagnosis  Lumbar spinal stenosis      Premium Reason  Non-Premium    Comments:

## 2020-12-15 NOTE — OP REPORT
DATE OF SERVICE:  12/15/2020     SURGEON:  Alberto Aguirre MD     FIRST ASSISTANT:  Erin Champion PA-C     ANESTHESIOLOGIST:  Royal Vaz DO     ANESTHESIA:  GETA.     PREOPERATIVE DIAGNOSES:  1.  Lumbar spondylolisthesis, L4-L5.  2.  Lumbar degenerative disk disease with radiculopathy, L4-L5.  3.  Morbid obesity.  4.  Prior lumbar laminotomies, L4-L5.  5.  Connective tissue foraminal stenosis with radiculopathy, L4-L5.  6.  Low back pain, right lumbar radiculopathy.     POSTOPERATIVE DIAGNOSES:  1.  Lumbar spondylolisthesis, L4-L5.  2.  Lumbar degenerative disk disease with radiculopathy, L4-L5.  3.  Morbid obesity.  4.  Prior lumbar laminotomies, L4-L5.  5.  Connective tissue foraminal stenosis with radiculopathy, L4-L5.  6.  Low back pain, right lumbar radiculopathy.     PROCEDURES PERFORMED:  1.  Right L4 transpedicular approach for decompression of the right L4 nerve.  2.  Right L5 transpedicular approach for decompression of the right L5 nerve.  3.  Right L4-L5 transforaminal lumbar interbody fusion.  4.  Placement of a 4WEB titanium TLIF intervertebral biomechanical device,   9x26 mm high and long, 6-degree lordotic at L4-L5.  5.  Bilateral Life Spine pedicle screw placed in L4 and L5.  6.  Posterolateral arthrodesis/fusion L4-L5.  7.  Interbody arthrodesis/fusion L4-L5.  8.  Morselized allograft for the purpose of interbody and posterolateral   arthrodesis and fusion.  9.  Morselized allograft, NanoBone for the purpose of interbody and   posterolateral arthrodesis and fusion, L4-L5.  10.  Microscopic dissection.     INDICATIONS FOR PROCEDURE:  This is a 70-year-old  who had multiple   lumbar diskectomies dating back to the 1980s.  He presented to clinic with   progressive back pain, ambulatory intolerance, right greater than left lumbar   radiculopathies.  MRI showed severe degenerative disk disease with a large   degenerated and herniated disk at right L4-L5 with a grade I spondylolisthesis    at L4-L5, anterolisthesis.  Surgery was indicated for low back pain, right   lumbar radiculopathy, right lower extremity weakness, ambulatory intolerance   in the setting of severe degenerative disk disease with a large herniated   degenerative disk, grade I spondylolisthesis at L4-L5.  We discussed   decompression and stabilization given the severity of his findings and his   severity of neurologic disability.  He understood risks, benefits and   alternatives of procedure and wished to proceed.     DESCRIPTION OF PROCEDURE:  The patient was brought to the operating room and   intubated by the anesthesiologist.  He was placed prone on a Ramin OSI   table.  He was marked, prepped and draped in the usual sterile fashion.    Preprocedure timeout was performed and 0.5% Marcaine with epinephrine was   infiltrated in the skin.  Biplanar fluoroscopy was utilized to make bilateral   paramedian incisions over the L4 and L5 pedicles.  A PediGuard was used to   create pedicle trajectory tracts of the bilateral L4 and L5 pedicles.  K-wires   were placed.  Subsequent dilation was completed, tapping of the pedicle   trajectories and 6.5x50 mm screws were placed at left L4, 6.5x55 mm screw was   placed at left L5 pedicle on the right.  Screw shanks were placed at right L4   and right L5.  The minimally invasive spine retractor was placed over the   right-sided pedicle screws.  A blunt dissection was carried down through the   erector spinae muscles with the retractor docked onto the screw shanks over   the right L4 and right L5 pedicles and facet complexes.  A medial retractor   blade was attached.  Monopolar electrocautery was used to dissect down through   soft tissue and I was able to visualize the scar tissue and the remainder of   the right L4 and right L5 lamina.  Microscope was brought in the field.  High   speed Midas Jhoan drill was used to drill redo laminotomies right L4 right L5   all the way out to the medial facet  complexes, identifying the screw shanks   and hence for the right L4 and right L5 pedicles by proxy.  Bone chips were   saved with a Penfield 1, placed in a specimen cup.  Drilling proceeded down to   the scar tissue, which was able to be identified.  Angled curette was able to   separate the scar tissue from the dura and the remainder of the redo   laminotomy right L4 and right L5 was carried out with a 2 and subsequently 3   mm Kerrison.  This was completed superiorly from the right L4 pedicle down to   the right L5 pedicle inferiorly.  Complete medial facetectomy was completed.    The pars interarticularis was resected as well in its entirety.  There was   quite significant scar tissue and calcified degenerative herniated disk in the   right lateral recess.  All the scar tissue and herniated disk were removed in   their entirety.  There was still far lateral recess stenosis over the right   L4 and right L5 nerve roots due to the severity of the degenerative disk space   collapse.  In order to address the far lateral foraminal stenosis over the   right L4 and right L5 nerves, transpedicular approach was completed, drilling   down the medial and superior border of the right L5 pedicle and drilling down   the medial and inferior border of the right L4 pedicle to further clear the   right L4 and right L5 nerve roots respectively.  At this point, I was pleased   with the far lateral decompression, central decompression.  A Penfield 4 was   utilized to separate the dura from old scar tissue.  The dura was very gently   medialized with a nerve root retractor.  The L4-L5 disk space was identified   and incised with an 11 blade.  With the aid of lateral fluoroscopy, complete   diskectomy was performed with sequential blunt paddle dilation starting at 6   mm, going up to 10 mm.  A 10 mm paddle shaver was utilized in the L4-L5 disk   space to start preparation of the endplates.  Complete diskectomy was   performed with a  curette and pituitary.  The endplates were prepared with a   curette as well.  No endplate breaches were noted.  A 9 mm high trial was   noted to fit the space well, although a 10 mm would have been more ideal given   the intervertebral disk space, the posterior osteophytes prevented insertion   of the 10 mm trial without excessive force and obviously this was not   necessary and so a 9 mm trial was completed.  A bone funnel was used to place   morselized allograft and morselized autograft into the L4-L5 disk space.  A 9   mm permanent titanium _____ interbody graft was packed with a combination of   morselized allograft, NanoBone as well as morselized autograft from the   decompression and this was inserted into the L4-L5 disk space in the lordotic   position and tamped into place.  Fluoroscopy revealed good hardware placement   at the appropriate surgical level.  The screw shanks were removed out of the   right L4 and right L5 pedicle trajectories and the pedicle screws were placed,   these were both 6.5x55 mm screws.  Prebent 50 mm jamey was placed through the   tulip heads bilaterally at L4-L5, secured in place with locking cap mechanisms   and torqued to specification x4.  The percutaneous towers were removed.    Final AP and lateral fluoroscopy confirmed good hardware placement in the   appropriate surgical levels.  Wounds were copiously irrigated.  Surgiflo was   used in the epidural space for hemostasis.  At this point, the wounds were   essentially dry and so no drain was placed.  Two grams of vancomycin powder   were infiltrated between the wounds.  The wounds were meticulously closed in   layers.  Steri-Strips were applied to the skin edges.  Sterile dressing was   applied.  The patient was extubated in the operating room and taken to PACU in   stable condition.     ESTIMATED BLOOD LOSS:  50 mL.     COMPLICATIONS:  None noted.        ______________________________  Alberto Aguirre MD SA/RADHA    DD:   12/15/2020 09:57  DT:  12/15/2020 12:17    Job#:  [id number]

## 2020-12-15 NOTE — ANESTHESIA POSTPROCEDURE EVALUATION
Patient: Toney Watson Jr.    Procedure Summary     Date: 12/15/20 Room / Location: [place] [address] / SURGERY [facility]    Anesthesia Start: 0706 Anesthesia Stop: 0956    Procedure: FUSION, SPINE, LUMBAR, PLIF-L4-5 MINIMALLY INVASIVE TLIF WITH BILATERAL SCREWS (Right Back) Diagnosis: (LUMBAR DEGENERATIVE DISC DISEASE AND STENOSIS)    Surgeons: Alberto Aguirre M.D. Responsible Provider: Royal Vaz D.O.    Anesthesia Type: general ASA Status: 3          Final Anesthesia Type: general  Last vitals  BP   Blood Pressure : 118/49    Temp   36.1 °C (97 °F)    Pulse   Pulse: 62   Resp   14    SpO2   100 %      Anesthesia Post Evaluation    Patient location during evaluation: PACU  Patient participation: complete - patient participated  Level of consciousness: awake and alert  Pain score: 4    Airway patency: patent  Anesthetic complications: no  Cardiovascular status: hemodynamically stable  Respiratory status: acceptable  Hydration status: euvolemic    PONV: none           Nurse Pain Score: 4 (NPRS)

## 2020-12-15 NOTE — ANESTHESIA PREPROCEDURE EVALUATION
Relevant Problems   CARDIAC   (+) CAD (coronary artery disease)- hx LAD stent    (+) HTN (hypertension)      Other   (+) Obesity       Physical Exam    Airway   Mallampati: II  TM distance: >3 FB  Neck ROM: full       Cardiovascular   Rhythm: irregular  Rate: normal  (-) murmur  Comments: Afib - rate controlled   Dental - normal exam           Pulmonary - normal exam  Breath sounds clear to auscultation     Abdominal    Neurological - normal exam               Anesthesia Plan    ASA 3   ASA physical status 3 criteria: morbid obesity - BMI greater than or equal to 40    Plan - general       Airway plan will be ETT        Induction: intravenous    Postoperative Plan: Postoperative administration of opioids is intended.    Pertinent diagnostic labs and testing reviewed    Informed Consent:    Anesthetic plan and risks discussed with patient.    Use of blood products discussed with: patient whom consented to blood products.

## 2020-12-15 NOTE — OR SURGEON
Immediate Post OP Note    PreOp Diagnosis: L4-5 DDD, lumbar stenosis with radiculopathy    PostOp Diagnosis: Same    Procedure(s):  FUSION, SPINE, LUMBAR, PLIF-L4-5 MINIMALLY INVASIVE TLIF WITH BILATERAL SCREWS - Wound Class: Clean with Drain    Surgeon(s):  Alberto Aguirre M.D.    Anesthesiologist/Type of Anesthesia:  Anesthesiologist: Royal Vaz D.O./General    Surgical Staff:  Assistant: Erin Champion P.A.-C.  Circulator: Royal Atwood R.N.  Relief Circulator: Elsa Wilkinson R.N.  Scrub Person: Ronel Velez  Radiology Technologist: Molly Rodriguez    Specimens removed if any:  * No specimens in log *    Estimated Blood Loss: 100ml    Findings: L4-5 DDD, went well, see dictation for full report.    Complications: None.        12/15/2020 9:58 AM Erin Champion P.A.-C.

## 2020-12-15 NOTE — OR NURSING
Pt in A Flutter with stable BP, stat EKG being performed. Dr. Vaz and Erin Champion at bedside, Metoprolol 2mg IV given without effect; EKG consistent with pre-op EKG.

## 2020-12-15 NOTE — OR NURSING
Pt resting in stable condition; tolerating orals; wife updated to pt status; awaiting room assignment.

## 2020-12-15 NOTE — PROGRESS NOTES
Pt arrived to the unit with the transporter via gurney.   Pt is AO x 4  On 2L of O2 via NC  SCDs on     2 RN skin check done with Sami CUNNINGHAM. Dressing to back. Gauze and tegaderm. Little serosanguinous drainage noted. Rash noted to the left knee. Per pt, it is psoriasis.     Safety precautions in place. Call light within reach. Bed in low and locked position. Hourly rounding in place. Updated on plan of care. Questions answered.

## 2020-12-15 NOTE — ANESTHESIA PROCEDURE NOTES
Airway    Date/Time: 12/15/2020 7:12 AM  Performed by: Royal Vaz D.O.  Authorized by: Royal Vaz D.O.     Location:  OR  Urgency:  Elective  Indications for Airway Management:  Anesthesia      Spontaneous Ventilation: absent    Sedation Level:  Deep  Preoxygenated: Yes    Patient Position:  Sniffing  Mask Difficulty Assessment:  0 - not attempted  Final Airway Type:  Endotracheal airway  Final Endotracheal Airway:  ETT  Cuffed: Yes    Technique Used for Successful ETT Placement:  Direct laryngoscopy    Insertion Site:  Oral  Blade Type:  Glide  Laryngoscope Blade/Videolaryngoscope Blade Size:  4  ETT Size (mm):  8.0  Measured from:  Teeth  ETT to Teeth (cm):  25  Placement Verified by: auscultation and capnometry    Cormack-Lehane Classification:  Grade I - full view of glottis  Number of Attempts at Approach:  1

## 2020-12-16 LAB
GLUCOSE BLD-MCNC: 102 MG/DL (ref 65–99)
GLUCOSE BLD-MCNC: 107 MG/DL (ref 65–99)
GLUCOSE BLD-MCNC: 138 MG/DL (ref 65–99)
GLUCOSE BLD-MCNC: 77 MG/DL (ref 65–99)

## 2020-12-16 PROCEDURE — 700101 HCHG RX REV CODE 250: Performed by: PHYSICIAN ASSISTANT

## 2020-12-16 PROCEDURE — 97165 OT EVAL LOW COMPLEX 30 MIN: CPT

## 2020-12-16 PROCEDURE — 700102 HCHG RX REV CODE 250 W/ 637 OVERRIDE(OP): Performed by: PHYSICIAN ASSISTANT

## 2020-12-16 PROCEDURE — 770006 HCHG ROOM/CARE - MED/SURG/GYN SEMI*

## 2020-12-16 PROCEDURE — 97162 PT EVAL MOD COMPLEX 30 MIN: CPT

## 2020-12-16 PROCEDURE — A9270 NON-COVERED ITEM OR SERVICE: HCPCS | Performed by: PHYSICIAN ASSISTANT

## 2020-12-16 PROCEDURE — 82962 GLUCOSE BLOOD TEST: CPT | Mod: 91

## 2020-12-16 RX ORDER — ACETAMINOPHEN 325 MG/1
650 TABLET ORAL EVERY 4 HOURS PRN
Status: DISCONTINUED | OUTPATIENT
Start: 2020-12-16 | End: 2020-12-19 | Stop reason: HOSPADM

## 2020-12-16 RX ADMIN — METFORMIN HYDROCHLORIDE 1000 MG: 500 TABLET ORAL at 08:55

## 2020-12-16 RX ADMIN — DOCUSATE SODIUM 100 MG: 100 CAPSULE ORAL at 05:36

## 2020-12-16 RX ADMIN — ANTACID TABLETS 500 MG: 500 TABLET, CHEWABLE ORAL at 05:36

## 2020-12-16 RX ADMIN — POTASSIUM CHLORIDE AND SODIUM CHLORIDE: 900; 150 INJECTION, SOLUTION INTRAVENOUS at 05:43

## 2020-12-16 RX ADMIN — METFORMIN HYDROCHLORIDE 1000 MG: 500 TABLET ORAL at 17:26

## 2020-12-16 RX ADMIN — OXYCODONE HYDROCHLORIDE 10 MG: 10 TABLET ORAL at 17:26

## 2020-12-16 RX ADMIN — DOCUSATE SODIUM 50 MG AND SENNOSIDES 8.6 MG 1 TABLET: 8.6; 5 TABLET, FILM COATED ORAL at 21:08

## 2020-12-16 RX ADMIN — INSULIN GLARGINE 80 UNITS: 100 INJECTION, SOLUTION SUBCUTANEOUS at 08:54

## 2020-12-16 RX ADMIN — OMEPRAZOLE 20 MG: 20 CAPSULE, DELAYED RELEASE ORAL at 05:36

## 2020-12-16 RX ADMIN — ATORVASTATIN CALCIUM 80 MG: 40 TABLET, FILM COATED ORAL at 05:36

## 2020-12-16 RX ADMIN — ACETAMINOPHEN 650 MG: 325 TABLET, FILM COATED ORAL at 05:43

## 2020-12-16 RX ADMIN — GABAPENTIN 900 MG: 300 CAPSULE ORAL at 17:26

## 2020-12-16 RX ADMIN — GABAPENTIN 900 MG: 300 CAPSULE ORAL at 05:36

## 2020-12-16 RX ADMIN — METHOCARBAMOL 750 MG: 750 TABLET ORAL at 14:24

## 2020-12-16 RX ADMIN — INSULIN GLARGINE 80 UNITS: 100 INJECTION, SOLUTION SUBCUTANEOUS at 17:33

## 2020-12-16 RX ADMIN — DIGOXIN 125 MCG: 125 TABLET ORAL at 05:36

## 2020-12-16 RX ADMIN — DULOXETINE HYDROCHLORIDE 60 MG: 60 CAPSULE, DELAYED RELEASE ORAL at 05:36

## 2020-12-16 RX ADMIN — OXYCODONE HYDROCHLORIDE 10 MG: 10 TABLET ORAL at 21:08

## 2020-12-16 RX ADMIN — OXYCODONE HYDROCHLORIDE 10 MG: 10 TABLET ORAL at 11:44

## 2020-12-16 RX ADMIN — ANTACID TABLETS 500 MG: 500 TABLET, CHEWABLE ORAL at 17:26

## 2020-12-16 RX ADMIN — DOCUSATE SODIUM 100 MG: 100 CAPSULE ORAL at 17:26

## 2020-12-16 ASSESSMENT — PAIN DESCRIPTION - PAIN TYPE
TYPE: SURGICAL PAIN
TYPE: SURGICAL PAIN
TYPE: ACUTE PAIN
TYPE: SURGICAL PAIN

## 2020-12-16 ASSESSMENT — COGNITIVE AND FUNCTIONAL STATUS - GENERAL
WALKING IN HOSPITAL ROOM: A LITTLE
MOBILITY SCORE: 14
TURNING FROM BACK TO SIDE WHILE IN FLAT BAD: A LITTLE
STANDING UP FROM CHAIR USING ARMS: A LITTLE
PERSONAL GROOMING: A LITTLE
DRESSING REGULAR LOWER BODY CLOTHING: A LOT
DRESSING REGULAR UPPER BODY CLOTHING: A LITTLE
CLIMB 3 TO 5 STEPS WITH RAILING: A LOT
SUGGESTED CMS G CODE MODIFIER DAILY ACTIVITY: CK
SUGGESTED CMS G CODE MODIFIER MOBILITY: CL
DAILY ACTIVITIY SCORE: 16
MOVING FROM LYING ON BACK TO SITTING ON SIDE OF FLAT BED: A LOT
MOVING TO AND FROM BED TO CHAIR: UNABLE
HELP NEEDED FOR BATHING: A LOT
TOILETING: A LOT

## 2020-12-16 ASSESSMENT — GAIT ASSESSMENTS
DEVIATION: ANTALGIC
DISTANCE (FEET): 20
ASSISTIVE DEVICE: FRONT WHEEL WALKER

## 2020-12-16 ASSESSMENT — ACTIVITIES OF DAILY LIVING (ADL): TOILETING: INDEPENDENT

## 2020-12-16 NOTE — PROGRESS NOTES
Patient refusing to let nursing staff change bed linens despite having spilled urine on them. Patient educated on importance of not sitting in a wet bed. Patient continues to refuse.

## 2020-12-16 NOTE — PROGRESS NOTES
Neurosurgery Progress Note    Subjective:  POD1 L4-5 TLIF  C/o LBP today  Some persistent right S1 paresthesias, present prior to sx  Otherwise denies leg pain  Minimal ambulation   Hx of afib, was in a flutter in PACU, repeat EKG stable  No drain in place    Exam:  Dressing with some strikethrough but was intact  5/5 strength to LE  Sensation equal to LE    BP  Min: 94/59  Max: 152/70  Pulse  Av.2  Min: 48  Max: 99  Resp  Avg: 15.2  Min: 9  Max: 21  Temp  Av.6 °C (97.9 °F)  Min: 35.8 °C (96.4 °F)  Max: 38.2 °C (100.8 °F)  SpO2  Av.9 %  Min: 90 %  Max: 100 %    No data recorded                      Intake/Output       12/15/20 0700 - 20 0659 20 0700 - 20 0659      [id number] Total 1900-0659 Total       Intake    I.V.  1300  -- 1300  --  -- --    Volume (mL) (Lactated Ringers) 1300 -- 1300 -- -- --    Total Intake 1300 -- 1300 -- -- --       Output    Urine  400  725 1125  --  -- --    Number of Times Voided 1 x 1 x 2 x -- -- --    Urine Void (mL)  -- -- --    Stool  --  -- --  --  -- --    Number of Times Stooled -- 0 x 0 x -- -- --    Blood  75  -- 75  --  -- --    Est. Blood Loss 75 -- 75 -- -- --    Total Output  -- -- --       Net I/O     825 -725 100 -- -- --            Intake/Output Summary (Last 24 hours) at 2020 0830  Last data filed at 2020 0546  Gross per 24 hour   Intake 800 ml   Output 1200 ml   Net -400 ml            • acetaminophen  650 mg Q4HRS PRN   • amLODIPine  2.5 mg Q EVENING   • atorvastatin  80 mg DAILY   • carvedilol  25 mg BID WITH MEALS   • digoxin  125 mcg DAILY   • DULoxetine  60 mg DAILY   • gabapentin  900 mg BID   • insulin glargine  80 Units BID   • lisinopril  40 mg DAILY   • metformin  1,000 mg BID WITH MEALS   • zolpidem  5 mg HS PRN   • Pharmacy Consult Request  1 Each PHARMACY TO DOSE   • MD ALERT...DO NOT ADMINISTER NSAIDS or ASPIRIN unless ORDERED By Neurosurgery  1 Each PRN   • docusate  sodium  100 mg BID   • senna-docusate  1 Tab Nightly   • senna-docusate  1 Tab Q24HRS PRN   • polyethylene glycol/lytes  1 Packet BID PRN   • magnesium hydroxide  30 mL QDAY PRN   • bisacodyl  10 mg Q24HRS PRN   • fleet  1 Each Once PRN   • 0.9 % NaCl with KCl 20 mEq 1,000 mL   Continuous   • oxyCODONE immediate-release  5 mg Q3HRS PRN   • oxyCODONE immediate release  10 mg Q3HRS PRN   • HYDROmorphone  0.5 mg Q3HRS PRN   • diphenhydrAMINE  25 mg Q6HRS PRN    Or   • diphenhydrAMINE  25 mg Q6HRS PRN   • ondansetron  4 mg Q4HRS PRN   • ondansetron  4 mg Q4HRS PRN   • methocarbamol  750 mg Q8HRS PRN   • ALPRAZolam  0.25 mg BID PRN   • cloNIDine  0.1 mg Q4HRS PRN   • benzocaine-menthol  1 Lozenge Q2HRS PRN   • calcium carbonate  500 mg BID   • omeprazole  20 mg DAILY   • insulin regular  3-14 Units 4X/DAY ACHS    And   • glucose  16 g Q15 MIN PRN    And   • dextrose 50%  50 mL Q15 MIN PRN       Assessment and Plan:  Hospital day #2  POD #1  PT/OT/ambulate as tolerated  Home tomorrow once LBP better tolerated  Prophylactic anticoagulation: yes         Start date/time: today

## 2020-12-16 NOTE — THERAPY
Physical Therapy   Initial Evaluation     Patient Name: Toney Watson Jr.  Age:  70 y.o., Sex:  male  Medical Record #: [id number]  Today's Date: 12/16/2020     Precautions: Fall Risk, Spinal / Back Precautions     Assessment  Patient is 70 y.o. male s/p L4-5 TLIF presenting to PT most limited by pain and feeling of being unwell during mobility. He required mod A to transition from supine to sit due to pain, but once upright, sustained at spv. Pt completed x20' of gait with FWW, but became diaphoretic and was returned to supine. /51mmHg. PT will follow to address gait deviations and difficulty with bed mobility. Anticipate pt to progress such that he will be functionally capable of return home. He does not appear ready today.     Plan    Recommend Physical Therapy 4 times per week until therapy goals are met for the following treatments:  Bed Mobility, Equipment, Gait Training, Neuro Re-Education / Balance, Self Care/Home Evaluation, Sensory Integration Techniques, Stair Training, Therapeutic Activities and Therapeutic Exercises    DC Equipment Recommendations: Front-Wheel Walker  Discharge Recommendations: Recommend outpatient physical therapy services to address higher level deficits        Objective       12/16/20 0846   Balance Assessment   Comments Seated at [facility]; standing at [facility] For safety   Gait Analysis   Gait Level Of Assist   (Contact Guard Assist)   Assistive Device Front Wheel Walker   Distance (Feet) 20   Comments pt limited distance 2/2 feeling unwell. Returned to supine with /51mmHg.    Bed Mobility    Supine to Sit Moderate Assist  (HHA for leverage)   Sit to Supine Supervised   Scooting Supervised   Functional Mobility   Sit to Stand Minimal Assist   Bed, Chair, Wheelchair Transfer Minimal Assist   Transfer Method Stand Step   Comments assist during transfer due to pt feeling unwell.    Short Term Goals    Short Term Goal # 1 Pt will perform bed mob via log roll at [facility] in 6tx to improve  functional independence.    Short Term Goal # 2 Pt will perform transfers with FWW at spv in 6tx to improve functional mobility.    Short Term Goal # 3 Pt will perform gait x150' wtih FWW at spv in 6tx to improve functional mobility.    Anticipated Discharge Equipment and Recommendations   DC Equipment Recommendations Front-Wheel Walker   Discharge Recommendations Recommend outpatient physical therapy services to address higher level deficits

## 2020-12-16 NOTE — DISCHARGE PLANNING
Anticipated Discharge Disposition: Home    Action: per chart review pt pending PT/OT recs and clearance by NSG. Will follow to DC with case management needs    Barriers to Discharge: Clearance, PT/OT recs    Plan: f/u with medical team, pt

## 2020-12-16 NOTE — DIETARY
NUTRITION SERVICES: BMI - Pt with BMI >40 (=Body mass index is 43.75 kg/m².), morbid obesity. Weight loss counseling not appropriate in acute care setting. RECOMMEND - Referral to outpatient nutrition services for weight management after D/C.

## 2020-12-16 NOTE — CARE PLAN
Problem: Communication  Goal: The ability to communicate needs accurately and effectively will improve  Outcome: PROGRESSING AS EXPECTED  Plan of care and pain mgmt reviewed.      Problem: Infection  Goal: Will remain free from infection  Outcome: PROGRESSING SLOWER THAN EXPECTED   Had fever tmax 100.8 this morning, now afebrile.

## 2020-12-16 NOTE — PROGRESS NOTES
Report received. Assumed care. Pt in bed. A/O x4. VSS. Responds appropriately. Denies chest pain and SOB. Assessment complete. Discussed POC, , pt verbalizes understanding. Explained importance of calling before getting OOB. Call light and belongings within reach. Bed in the lowest position. Treaded socks in place. Hourly rounding in progress.

## 2020-12-17 ENCOUNTER — APPOINTMENT (OUTPATIENT)
Dept: RADIOLOGY | Facility: MEDICAL CENTER | Age: 70
DRG: 460 | End: 2020-12-17
Attending: PHYSICIAN ASSISTANT
Payer: COMMERCIAL

## 2020-12-17 LAB
ANION GAP SERPL CALC-SCNC: 7 MMOL/L (ref 7–16)
BASOPHILS # BLD AUTO: 0.3 % (ref 0–1.8)
BASOPHILS # BLD: 0.03 K/UL (ref 0–0.12)
BUN SERPL-MCNC: 13 MG/DL (ref 8–22)
CALCIUM SERPL-MCNC: 8.7 MG/DL (ref 8.5–10.5)
CHLORIDE SERPL-SCNC: 99 MMOL/L (ref 96–112)
CO2 SERPL-SCNC: 27 MMOL/L (ref 20–33)
CREAT SERPL-MCNC: 1.06 MG/DL (ref 0.5–1.4)
EKG IMPRESSION: NORMAL
EOSINOPHIL # BLD AUTO: 0.13 K/UL (ref 0–0.51)
EOSINOPHIL NFR BLD: 1.1 % (ref 0–6.9)
ERYTHROCYTE [DISTWIDTH] IN BLOOD BY AUTOMATED COUNT: 46.7 FL (ref 35.9–50)
GLUCOSE BLD-MCNC: 106 MG/DL (ref 65–99)
GLUCOSE BLD-MCNC: 121 MG/DL (ref 65–99)
GLUCOSE BLD-MCNC: 83 MG/DL (ref 65–99)
GLUCOSE BLD-MCNC: 94 MG/DL (ref 65–99)
GLUCOSE SERPL-MCNC: 124 MG/DL (ref 65–99)
HCT VFR BLD AUTO: 33.4 % (ref 42–52)
HGB BLD-MCNC: 10.8 G/DL (ref 14–18)
IMM GRANULOCYTES # BLD AUTO: 0.08 K/UL (ref 0–0.11)
IMM GRANULOCYTES NFR BLD AUTO: 0.7 % (ref 0–0.9)
LYMPHOCYTES # BLD AUTO: 1.43 K/UL (ref 1–4.8)
LYMPHOCYTES NFR BLD: 12.4 % (ref 22–41)
MCH RBC QN AUTO: 30.8 PG (ref 27–33)
MCHC RBC AUTO-ENTMCNC: 32 G/DL (ref 33.7–35.3)
MCV RBC AUTO: 96.3 FL (ref 81.4–97.8)
MONOCYTES # BLD AUTO: 1.03 K/UL (ref 0–0.85)
MONOCYTES NFR BLD AUTO: 8.9 % (ref 0–13.4)
NEUTROPHILS # BLD AUTO: 8.85 K/UL (ref 1.82–7.42)
NEUTROPHILS NFR BLD: 76.6 % (ref 44–72)
NRBC # BLD AUTO: 0 K/UL
NRBC BLD-RTO: 0 /100 WBC
PLATELET # BLD AUTO: 162 K/UL (ref 164–446)
PMV BLD AUTO: 10.4 FL (ref 9–12.9)
POTASSIUM SERPL-SCNC: 4.3 MMOL/L (ref 3.6–5.5)
RBC # BLD AUTO: 3.47 M/UL (ref 4.7–6.1)
SODIUM SERPL-SCNC: 133 MMOL/L (ref 135–145)
WBC # BLD AUTO: 11.6 K/UL (ref 4.8–10.8)

## 2020-12-17 PROCEDURE — 80048 BASIC METABOLIC PNL TOTAL CA: CPT

## 2020-12-17 PROCEDURE — 700102 HCHG RX REV CODE 250 W/ 637 OVERRIDE(OP): Performed by: PHYSICIAN ASSISTANT

## 2020-12-17 PROCEDURE — A9270 NON-COVERED ITEM OR SERVICE: HCPCS | Performed by: PHYSICIAN ASSISTANT

## 2020-12-17 PROCEDURE — 700111 HCHG RX REV CODE 636 W/ 250 OVERRIDE (IP): Performed by: PHYSICIAN ASSISTANT

## 2020-12-17 PROCEDURE — 97530 THERAPEUTIC ACTIVITIES: CPT

## 2020-12-17 PROCEDURE — 770006 HCHG ROOM/CARE - MED/SURG/GYN SEMI*

## 2020-12-17 PROCEDURE — 93005 ELECTROCARDIOGRAM TRACING: CPT | Performed by: NEUROLOGICAL SURGERY

## 2020-12-17 PROCEDURE — 93010 ELECTROCARDIOGRAM REPORT: CPT | Mod: 26 | Performed by: INTERNAL MEDICINE

## 2020-12-17 PROCEDURE — 97535 SELF CARE MNGMENT TRAINING: CPT | Mod: CQ

## 2020-12-17 PROCEDURE — 700101 HCHG RX REV CODE 250: Performed by: PHYSICIAN ASSISTANT

## 2020-12-17 PROCEDURE — 82962 GLUCOSE BLOOD TEST: CPT | Mod: 91

## 2020-12-17 PROCEDURE — 94660 CPAP INITIATION&MGMT: CPT

## 2020-12-17 PROCEDURE — 36415 COLL VENOUS BLD VENIPUNCTURE: CPT

## 2020-12-17 PROCEDURE — 85025 COMPLETE CBC W/AUTO DIFF WBC: CPT

## 2020-12-17 RX ORDER — KETOROLAC TROMETHAMINE 30 MG/ML
30 INJECTION, SOLUTION INTRAMUSCULAR; INTRAVENOUS EVERY 6 HOURS
Status: DISCONTINUED | OUTPATIENT
Start: 2020-12-17 | End: 2020-12-17

## 2020-12-17 RX ORDER — KETOROLAC TROMETHAMINE 30 MG/ML
15 INJECTION, SOLUTION INTRAMUSCULAR; INTRAVENOUS EVERY 6 HOURS
Status: DISCONTINUED | OUTPATIENT
Start: 2020-12-17 | End: 2020-12-18

## 2020-12-17 RX ADMIN — DULOXETINE HYDROCHLORIDE 60 MG: 60 CAPSULE, DELAYED RELEASE ORAL at 06:29

## 2020-12-17 RX ADMIN — DOCUSATE SODIUM 50 MG AND SENNOSIDES 8.6 MG 1 TABLET: 8.6; 5 TABLET, FILM COATED ORAL at 20:27

## 2020-12-17 RX ADMIN — INSULIN GLARGINE 80 UNITS: 100 INJECTION, SOLUTION SUBCUTANEOUS at 17:38

## 2020-12-17 RX ADMIN — DIGOXIN 125 MCG: 125 TABLET ORAL at 06:29

## 2020-12-17 RX ADMIN — DOCUSATE SODIUM 100 MG: 100 CAPSULE ORAL at 06:28

## 2020-12-17 RX ADMIN — CARVEDILOL 25 MG: 6.25 TABLET, FILM COATED ORAL at 09:00

## 2020-12-17 RX ADMIN — LISINOPRIL 40 MG: 20 TABLET ORAL at 06:38

## 2020-12-17 RX ADMIN — ATORVASTATIN CALCIUM 80 MG: 40 TABLET, FILM COATED ORAL at 06:29

## 2020-12-17 RX ADMIN — OXYCODONE HYDROCHLORIDE 10 MG: 10 TABLET ORAL at 17:42

## 2020-12-17 RX ADMIN — DOCUSATE SODIUM 100 MG: 100 CAPSULE ORAL at 17:42

## 2020-12-17 RX ADMIN — KETOROLAC TROMETHAMINE 15 MG: 30 INJECTION, SOLUTION INTRAMUSCULAR at 17:42

## 2020-12-17 RX ADMIN — METFORMIN HYDROCHLORIDE 1000 MG: 500 TABLET ORAL at 09:00

## 2020-12-17 RX ADMIN — METFORMIN HYDROCHLORIDE 1000 MG: 500 TABLET ORAL at 17:42

## 2020-12-17 RX ADMIN — GABAPENTIN 900 MG: 300 CAPSULE ORAL at 17:42

## 2020-12-17 RX ADMIN — ANTACID TABLETS 500 MG: 500 TABLET, CHEWABLE ORAL at 06:29

## 2020-12-17 RX ADMIN — INSULIN GLARGINE 80 UNITS: 100 INJECTION, SOLUTION SUBCUTANEOUS at 06:30

## 2020-12-17 RX ADMIN — GABAPENTIN 900 MG: 300 CAPSULE ORAL at 06:29

## 2020-12-17 RX ADMIN — OMEPRAZOLE 20 MG: 20 CAPSULE, DELAYED RELEASE ORAL at 06:29

## 2020-12-17 RX ADMIN — POLYETHYLENE GLYCOL 3350 1 PACKET: 17 POWDER, FOR SOLUTION ORAL at 22:42

## 2020-12-17 RX ADMIN — METHOCARBAMOL 750 MG: 750 TABLET ORAL at 22:30

## 2020-12-17 ASSESSMENT — LIFESTYLE VARIABLES: EVER_SMOKED: YES

## 2020-12-17 ASSESSMENT — COPD QUESTIONNAIRES
DURING THE PAST 4 WEEKS HOW MUCH DID YOU FEEL SHORT OF BREATH: NONE/LITTLE OF THE TIME
COPD SCREENING SCORE: 5
HAVE YOU SMOKED AT LEAST 100 CIGARETTES IN YOUR ENTIRE LIFE: YES
DO YOU EVER COUGH UP ANY MUCUS OR PHLEGM?: NO/ONLY WITH OCCASIONAL COLDS OR INFECTIONS

## 2020-12-17 ASSESSMENT — COGNITIVE AND FUNCTIONAL STATUS - GENERAL
DAILY ACTIVITIY SCORE: 16
SUGGESTED CMS G CODE MODIFIER DAILY ACTIVITY: CK
HELP NEEDED FOR BATHING: A LOT
DRESSING REGULAR UPPER BODY CLOTHING: A LITTLE
TOILETING: A LOT
PERSONAL GROOMING: A LITTLE
DRESSING REGULAR LOWER BODY CLOTHING: A LOT

## 2020-12-17 ASSESSMENT — PAIN DESCRIPTION - PAIN TYPE
TYPE: SURGICAL PAIN

## 2020-12-17 NOTE — THERAPY
Missed Therapy     Patient Name: Toney Watson Jr.  Age:  70 y.o., Sex:  male  Medical Record #: [id number]  Today's Date: 12/17/2020    Discussed missed therapy with RN.    Attempted to see pt for therapy. Per RN, pt w/ new R LE symptoms and is pending MRI. Assisted OT as pt was found EOB w/ oxymask off and pt falling over. Pt needing a lot of cues to attend to task to safely get BTB. Will continue to follow and monitor for MRI results.

## 2020-12-17 NOTE — PROGRESS NOTES
This RN noticed patient's heart rate on the continuous pulse ox monitor was jumping between mid 60's, mid 80's to 110's while patient was at rest and sleeping. Patient does have a history of A-fib and was in atrial flutter while in the PACU on 12/15. Patient's blood pressure has been stable, however some blood pressure medications have not been given because the patient's blood pressure has been below the ordered administration parameters. Patient's mentation appears a bit altered and patient has been aggressive this shift with nursing staff. Notified Dr. Conklin of the information mentioned above and received order for STAT EKG.

## 2020-12-17 NOTE — DISCHARGE PLANNING
Anticipated Discharge Disposition: Home    Action: Spoke to pt at bedside, pt states he has a FWW at home, will not need one on dc. Per PT's note yesterday pt can do outpt therapy, pt shouldn't have any CM needs on DC, however, will follow.     Barriers to Discharge: NSG clearance    Plan: f/u with medical team, pt

## 2020-12-17 NOTE — PROGRESS NOTES
Patient's daughter Molly Watson called and requesting an update about potential discharge plan if possible later today. Patient's daughter Molly can be reached at [phone].

## 2020-12-17 NOTE — CARE PLAN
Problem: Safety  Goal: Will remain free from injury  Outcome: PROGRESSING AS EXPECTED  Pt educated on use of call light and to call for assistance.      Problem: Venous Thromboembolism (VTW)/Deep Vein Thrombosis (DVT) Prevention:  Goal: Patient will participate in Venous Thrombosis (VTE)/Deep Vein Thrombosis (DVT)Prevention Measures  Outcome: PROGRESSING AS EXPECTED  SCDs worn throughout shift.

## 2020-12-17 NOTE — THERAPY
"Occupational Therapy  Daily Treatment     Patient Name: Toney Watson Jr.  Age:  70 y.o., Sex:  male  Medical Record #: [id number]  Today's Date: 12/17/2020     Precautions  Precautions: (P) Fall Risk, Spinal / Back Precautions   Comments: (P) no brace ordered    Assessment  Pt seen for OT tx, as he was found sitting at EOB tangled in his lines and appearing confused w/his oxy mask off. Pt required assist to safely reposition himself in bed. Attempted to modifications to ADL's, but pts attention was limited. Therapist corrected lines and assisted pt BTB, RN and CNA notified of situation. OT will follow up again tomorrow     Plan  Treatment plan modified to 4 times per week until therapy goals are met for the following treatments:  Adaptive Equipment, Self Care/Activities of Daily Living, Therapeutic Activities and Therapeutic Exercises.    DC Equipment Recommendations: (P) Unable to determine at this time  Discharge Recommendations: (P) Recommend home health for continued occupational therapy services    Subjective  \"I just need to lay down\"      Objective     12/17/20 1057   Cognition    Cognition / Consciousness X   Level of Consciousness Confused   Comments pt was found sitting EOB, tangled in lines, slow to respond also noted O2 was off appeared restless    Other Treatments   Other Treatments Provided provided re-education on safety in hopstial using of call light, and repsoitioning    Balance   Sitting Balance (Static) Fair +   Sitting Balance (Dynamic) Fair   Standing Balance (Static) Fair   Standing Balance (Dynamic) Fair -   Weight Shift Sitting Fair   Weight Shift Standing Fair   Skilled Intervention Verbal Cuing   Comments w/HHA    Bed Mobility    Supine to Sit Minimal Assist   Sit to Supine Minimal Assist   Scooting Supervised   Rolling Supervised   Skilled Intervention Verbal Cuing;Tactile Cuing   Activities of Daily Living   Upper Body Dressing Minimal Assist   Lower Body Dressing Maximal Assist "   Toileting   (using urinal )   Skilled Intervention Facilitation;Tactile Cuing;Verbal Cuing   Comments needed assist to thread underwear and pull them up    Functional Mobility   Sit to Stand Minimal Assist   Bed, Chair, Wheelchair Transfer Minimal Assist   Mobility EOB sit>stand then BTB    Skilled Intervention Verbal Cuing;Tactile Cuing;Facilitation   Comments cues for safety and HHA d/t impulsivity needs fww    Visual Perception   Visual Perception  Not Tested   Activity Tolerance   Comments appeared fatigued and confused    Patient / Family Goals   Patient / Family Goal #1 To have less pain   Goal #1 Outcome Goal not met   Short Term Goals   Short Term Goal # 1 Pt will be supervisied with ADL transfers   Goal Outcome # 1 Goal not met   Short Term Goal # 2 Pt will dress LB with AE & supervision   Goal Outcome # 2 Goal not met   Short Term Goal # 3 Pt will demonstrate spinal precautions during ADL's   Goal Outcome # 3 Goal not met   Education Group   Role of Occupational Therapist Patient Response Patient;Acceptance;Explanation;Verbal Demonstration   Back Safety Patient Response Patient;Acceptance;Explanation;Handout;Demonstration;Verbal Demonstration;Reinforcement Needed   Anticipated Discharge Equipment and Recommendations   DC Equipment Recommendations Unable to determine at this time   Discharge Recommendations Recommend home health for continued occupational therapy services   Interdisciplinary Plan of Care Collaboration   IDT Collaboration with  Nursing   Patient Position at End of Therapy In Bed;Call Light within Reach;Tray Table within Reach;Phone within Reach   Collaboration Comments RN aware of pts confusiong and poor position in room    Session Information   Date / Session Number  12/17 #2 (2/4, 12/22)   Priority 4

## 2020-12-17 NOTE — CARE PLAN
Problem: Communication  Goal: The ability to communicate needs accurately and effectively will improve  Outcome: PROGRESSING AS EXPECTED  Plan of care reviewed await MRI.      Problem: Pain Management  Goal: Pain level will decrease to patient's comfort goal  Outcome: PROGRESSING AS EXPECTED   Patient has declined pain medication thus far.

## 2020-12-17 NOTE — PROGRESS NOTES
Patient with saturated dressing order already placed for change, has no drain with this surgery dressing changed as ordered.    Set up for breakfast, patients PIV out believe to have got caught while turning at some point Jr RN at bedside to attempt new PIV access.    Patient states he has worsening pain in his RLE he states he discussed with neurosurgery on rounds an MRI is already ordered, checklist started.

## 2020-12-17 NOTE — THERAPY
"Occupational Therapy   Initial Evaluation     Patient Name: Toney Watson Jr.  Age:  70 y.o., Sex:  male  Medical Record #: [id number]  Today's Date: 12/16/2020       Precautions: Fall Risk, Spinal / Back Precautions   Comments: no brace ordered    Assessment  Patient is 70 y.o. male with a diagnosis of low back pain.  Pt is s/p L4-5 TLIF.  Additional factors influencing patient status / progress: Pt has sleep apnea & is obese.  Pt was pleasant & receptive to new learning but today was limited by significant back pain.  Suspect pt will improve once pain is better controlled.      Plan    Recommend Occupational Therapy 3 times per week until therapy goals are met for the following treatments:  Adaptive Equipment, Neuro Re-Education / Balance, Self Care/Activities of Daily Living, Therapeutic Activities and Therapeutic Exercises.    DC Equipment Recommendations: Unable to determine at this time  Discharge Recommendations: Recommend home health for continued occupational therapy services     Subjective    \"I have to lay back down, this pain is unbearable\"     Objective       12/16/20 1321   Prior Living Situation   Prior Services None   Housing / Facility [address] [facility]   Steps Into Home 1   Equipment Owned Single Point Cane   Lives with - Patient's Self Care Capacity Spouse   Comments pt reports his wife can assist upon D/C if needed   Cognition    Comments pleasant distracted by pain   Balance Assessment   Sitting Balance (Static) Fair +   Sitting Balance (Dynamic) Fair   Standing Balance (Static) Fair   Standing Balance (Dynamic) Fair -   Weight Shift Sitting Fair   Weight Shift Standing Fair   Bed Mobility    Supine to Sit Minimal Assist   Sit to Supine Minimal Assist   Scooting Supervised   Rolling Supervised   ADL Assessment   Eating Modified Independent   Grooming Supervision;Seated   Upper Body Dressing Minimal Assist   Lower Body Dressing Maximal Assist   Toileting Moderate Assist   Functional Mobility   Sit " to Stand Minimal Assist   Bed, Chair, Wheelchair Transfer Unable to Participate   Comments pt declined transfer to toilet or chair due to pain   Patient / Family Goals   Patient / Family Goal #1 To have less pain   Short Term Goals   Short Term Goal # 1 Pt will be supervisied with ADL transfers   Short Term Goal # 2 Pt will dress LB with AE & supervision   Short Term Goal # 3 Pt will demonstrate spinal precautions during ADL's

## 2020-12-17 NOTE — PROGRESS NOTES
Neurosurgery Progress Note    Subjective:  POD2 L4-5 TLIF  C/o LBP today  Some persistent right > left S1 paresthesias, present prior to sx but now worsening  Minimal ambulation due to pain and stating he feels weak  Hx of afib, was in a flutter in PACU, repeat EKG stable  No drain in place    Exam:  Dressing saturated today  5/5 strength to LE  Sensation equal to LE    BP  Min: 94/55  Max: 153/79  Pulse  Av.3  Min: 73  Max: 97  Resp  Av.4  Min: 18  Max: 19  Temp  Av.1 °C (98.8 °F)  Min: 36.8 °C (98.2 °F)  Max: 37.7 °C (99.8 °F)  SpO2  Av.1 %  Min: 90 %  Max: 97 %    No data recorded                      Intake/Output       20 0700 - 20 0659 20 07 - 20 0659       6796-9213 Total 5211-3442 0046-9707 Total       Intake    P.O.  600  -- 600  --  -- --    P.O. 600 -- 600 -- -- --    Total Intake 600 -- 600 -- -- --       Output    Urine  --  800 800  --  -- --    Number of Times Voided 1 x 3 x 4 x -- -- --    Urine Void (mL) -- 800 800 -- -- --    Emesis  0  -- 0  --  -- --    Emesis 0 -- 0 -- -- --    Stool  --  -- --  --  -- --    Number of Times Stooled 0 x -- 0 x -- -- --    Total Output 0 800 800 -- -- --       Net I/O     600 -800 -200 -- -- --            Intake/Output Summary (Last 24 hours) at 2020 0810  Last data filed at 2020 0347  Gross per 24 hour   Intake 600 ml   Output 800 ml   Net -200 ml            • acetaminophen  650 mg Q4HRS PRN   • amLODIPine  2.5 mg Q EVENING   • atorvastatin  80 mg DAILY   • carvedilol  25 mg BID WITH MEALS   • digoxin  125 mcg DAILY   • DULoxetine  60 mg DAILY   • gabapentin  900 mg BID   • insulin glargine  80 Units BID   • lisinopril  40 mg DAILY   • metformin  1,000 mg BID WITH MEALS   • zolpidem  5 mg HS PRN   • Pharmacy Consult Request  1 Each PHARMACY TO DOSE   • MD ALERT...DO NOT ADMINISTER NSAIDS or ASPIRIN unless ORDERED By Neurosurgery  1 Each PRN   • docusate sodium  100 mg BID   • senna-docusate  1 Tab  Nightly   • senna-docusate  1 Tab Q24HRS PRN   • polyethylene glycol/lytes  1 Packet BID PRN   • magnesium hydroxide  30 mL QDAY PRN   • bisacodyl  10 mg Q24HRS PRN   • fleet  1 Each Once PRN   • 0.9 % NaCl with KCl 20 mEq 1,000 mL   Continuous   • oxyCODONE immediate-release  5 mg Q3HRS PRN   • oxyCODONE immediate release  10 mg Q3HRS PRN   • HYDROmorphone  0.5 mg Q3HRS PRN   • diphenhydrAMINE  25 mg Q6HRS PRN    Or   • diphenhydrAMINE  25 mg Q6HRS PRN   • ondansetron  4 mg Q4HRS PRN   • ondansetron  4 mg Q4HRS PRN   • methocarbamol  750 mg Q8HRS PRN   • ALPRAZolam  0.25 mg BID PRN   • cloNIDine  0.1 mg Q4HRS PRN   • benzocaine-menthol  1 Lozenge Q2HRS PRN   • calcium carbonate  500 mg BID   • omeprazole  20 mg DAILY   • insulin regular  3-14 Units 4X/DAY ACHS    And   • glucose  16 g Q15 MIN PRN    And   • dextrose 50%  50 mL Q15 MIN PRN       Assessment and Plan:  Hospital day #3  POD #2  PT/OT/ambulate as tolerated  Change dressing  Ordering lumbar MRI today for worsening pain  Prophylactic anticoagulation: yes         Start date/time: today

## 2020-12-18 LAB
GLUCOSE BLD-MCNC: 105 MG/DL (ref 65–99)
GLUCOSE BLD-MCNC: 155 MG/DL (ref 65–99)
GLUCOSE BLD-MCNC: 155 MG/DL (ref 65–99)
GLUCOSE BLD-MCNC: 54 MG/DL (ref 65–99)
GLUCOSE BLD-MCNC: 82 MG/DL (ref 65–99)
GLUCOSE BLD-MCNC: 99 MG/DL (ref 65–99)

## 2020-12-18 PROCEDURE — 97530 THERAPEUTIC ACTIVITIES: CPT

## 2020-12-18 PROCEDURE — L0637 LSO SC R ANT/POS PNL PRE CST: HCPCS

## 2020-12-18 PROCEDURE — 97535 SELF CARE MNGMENT TRAINING: CPT | Mod: CO

## 2020-12-18 PROCEDURE — 94660 CPAP INITIATION&MGMT: CPT

## 2020-12-18 PROCEDURE — A9270 NON-COVERED ITEM OR SERVICE: HCPCS | Performed by: PHYSICIAN ASSISTANT

## 2020-12-18 PROCEDURE — 700111 HCHG RX REV CODE 636 W/ 250 OVERRIDE (IP): Performed by: PHYSICIAN ASSISTANT

## 2020-12-18 PROCEDURE — 97116 GAIT TRAINING THERAPY: CPT

## 2020-12-18 PROCEDURE — 700105 HCHG RX REV CODE 258: Performed by: PHYSICIAN ASSISTANT

## 2020-12-18 PROCEDURE — 700102 HCHG RX REV CODE 250 W/ 637 OVERRIDE(OP): Performed by: PHYSICIAN ASSISTANT

## 2020-12-18 PROCEDURE — 770006 HCHG ROOM/CARE - MED/SURG/GYN SEMI*

## 2020-12-18 PROCEDURE — 82962 GLUCOSE BLOOD TEST: CPT

## 2020-12-18 RX ORDER — METHOCARBAMOL 750 MG/1
750 TABLET, FILM COATED ORAL 4 TIMES DAILY
Qty: 80 TAB | Refills: 0 | Status: SHIPPED | OUTPATIENT
Start: 2020-12-18 | End: 2020-12-18 | Stop reason: SDUPTHER

## 2020-12-18 RX ORDER — OXYCODONE HYDROCHLORIDE AND ACETAMINOPHEN 5; 325 MG/1; MG/1
1-2 TABLET ORAL EVERY 4 HOURS PRN
Qty: 84 TAB | Refills: 0 | Status: SHIPPED | OUTPATIENT
Start: 2020-12-18 | End: 2021-01-01

## 2020-12-18 RX ORDER — METHOCARBAMOL 750 MG/1
750 TABLET, FILM COATED ORAL 4 TIMES DAILY
Qty: 80 TAB | Refills: 0 | Status: SHIPPED | OUTPATIENT
Start: 2020-12-18 | End: 2021-01-07

## 2020-12-18 RX ORDER — SODIUM CHLORIDE 9 MG/ML
INJECTION, SOLUTION INTRAVENOUS ONCE
Status: COMPLETED | OUTPATIENT
Start: 2020-12-18 | End: 2020-12-19

## 2020-12-18 RX ADMIN — MAGNESIUM HYDROXIDE 30 ML: 400 SUSPENSION ORAL at 17:17

## 2020-12-18 RX ADMIN — OMEPRAZOLE 20 MG: 20 CAPSULE, DELAYED RELEASE ORAL at 05:26

## 2020-12-18 RX ADMIN — INSULIN HUMAN 3 UNITS: 100 INJECTION, SOLUTION PARENTERAL at 13:04

## 2020-12-18 RX ADMIN — CARVEDILOL 25 MG: 6.25 TABLET, FILM COATED ORAL at 09:52

## 2020-12-18 RX ADMIN — OXYCODONE HYDROCHLORIDE 10 MG: 10 TABLET ORAL at 13:50

## 2020-12-18 RX ADMIN — ANTACID TABLETS 500 MG: 500 TABLET, CHEWABLE ORAL at 18:04

## 2020-12-18 RX ADMIN — LISINOPRIL 40 MG: 20 TABLET ORAL at 09:52

## 2020-12-18 RX ADMIN — OXYCODONE HYDROCHLORIDE 5 MG: 5 TABLET ORAL at 10:20

## 2020-12-18 RX ADMIN — AMLODIPINE BESYLATE 2.5 MG: 5 TABLET ORAL at 18:05

## 2020-12-18 RX ADMIN — ATORVASTATIN CALCIUM 80 MG: 40 TABLET, FILM COATED ORAL at 05:25

## 2020-12-18 RX ADMIN — GABAPENTIN 900 MG: 300 CAPSULE ORAL at 05:26

## 2020-12-18 RX ADMIN — DULOXETINE HYDROCHLORIDE 60 MG: 60 CAPSULE, DELAYED RELEASE ORAL at 05:26

## 2020-12-18 RX ADMIN — INSULIN HUMAN 3 UNITS: 100 INJECTION, SOLUTION PARENTERAL at 18:08

## 2020-12-18 RX ADMIN — DOCUSATE SODIUM 100 MG: 100 CAPSULE ORAL at 05:25

## 2020-12-18 RX ADMIN — CARVEDILOL 25 MG: 6.25 TABLET, FILM COATED ORAL at 18:05

## 2020-12-18 RX ADMIN — INSULIN GLARGINE 80 UNITS: 100 INJECTION, SOLUTION SUBCUTANEOUS at 18:09

## 2020-12-18 RX ADMIN — KETOROLAC TROMETHAMINE 15 MG: 30 INJECTION, SOLUTION INTRAMUSCULAR at 05:27

## 2020-12-18 RX ADMIN — SODIUM CHLORIDE: 9 INJECTION, SOLUTION INTRAVENOUS at 17:17

## 2020-12-18 RX ADMIN — METFORMIN HYDROCHLORIDE 1000 MG: 500 TABLET ORAL at 18:05

## 2020-12-18 RX ADMIN — DIGOXIN 125 MCG: 125 TABLET ORAL at 05:26

## 2020-12-18 RX ADMIN — GABAPENTIN 900 MG: 300 CAPSULE ORAL at 18:04

## 2020-12-18 RX ADMIN — ANTACID TABLETS 500 MG: 500 TABLET, CHEWABLE ORAL at 05:26

## 2020-12-18 RX ADMIN — DOCUSATE SODIUM 100 MG: 100 CAPSULE ORAL at 18:04

## 2020-12-18 RX ADMIN — KETOROLAC TROMETHAMINE 15 MG: 30 INJECTION, SOLUTION INTRAMUSCULAR at 12:57

## 2020-12-18 ASSESSMENT — COGNITIVE AND FUNCTIONAL STATUS - GENERAL
CLIMB 3 TO 5 STEPS WITH RAILING: A LOT
TOILETING: A LOT
DRESSING REGULAR UPPER BODY CLOTHING: A LITTLE
MOBILITY SCORE: 19
SUGGESTED CMS G CODE MODIFIER MOBILITY: CK
SUGGESTED CMS G CODE MODIFIER DAILY ACTIVITY: CK
STANDING UP FROM CHAIR USING ARMS: A LITTLE
DRESSING REGULAR LOWER BODY CLOTHING: A LITTLE
MOVING FROM LYING ON BACK TO SITTING ON SIDE OF FLAT BED: A LITTLE
HELP NEEDED FOR BATHING: A LOT
DAILY ACTIVITIY SCORE: 17
WALKING IN HOSPITAL ROOM: A LITTLE
PERSONAL GROOMING: A LITTLE

## 2020-12-18 ASSESSMENT — PAIN DESCRIPTION - PAIN TYPE
TYPE: ACUTE PAIN
TYPE: ACUTE PAIN
TYPE: SURGICAL PAIN
TYPE: ACUTE PAIN

## 2020-12-18 ASSESSMENT — GAIT ASSESSMENTS
ASSISTIVE DEVICE: FRONT WHEEL WALKER
GAIT LEVEL OF ASSIST: MINIMAL ASSIST
DEVIATION: ANTALGIC
DISTANCE (FEET): 5

## 2020-12-18 NOTE — THERAPY
"Occupational Therapy  Daily Treatment     Patient Name: Toney Watson Jr.  Age:  70 y.o., Sex:  male  Medical Record #: [id number]  Today's Date: 12/18/2020     Precautions: Fall Risk, Spinal / Back Precautions   Comments: no brace    Assessment    Pt seen in OT session.  Pt's spinal precautions, generalized weakness, quick fatigue, and back pain limit his ability to complete ADLs and IADLs independently.  Pt with flat affect, and reports no need to use bathroom at this time.  Pt able to complete LB dressing with supv.  Pt given h/o for AE and DME.  Reports having help at home and able to recall spinal precautions.  Pt reports using logroll \"since 1986.\"  Pt continues to make progress on OT goals.    Plan    Continue current treatment plan.    DC Equipment Recommendations: Unable to determine at this time  Discharge Recommendations: Recommend home health for continued occupational therapy services.       12/18/20 1013   Cognition    Cognition / Consciousness X   Level of Consciousness Alert   Comments pt found up in chair, agreeable to tx   Strength Upper Body   Upper Body Strength  WDL   Balance   Sitting Balance (Static) Fair +   Sitting Balance (Dynamic) Fair   Functional Mobility   Comments focused on LB dressing and home safety   Short Term Goals   Short Term Goal # 1 Pt will be supervisied with ADL transfers   Goal Outcome # 1 Goal not met   Short Term Goal # 2 Pt will dress LB with AE & supervision   Goal Outcome # 2 Goal met   Short Term Goal # 3 Pt will demonstrate spinal precautions during ADL's   Goal Outcome # 3 Goal met   Anticipated Discharge Equipment and Recommendations   DC Equipment Recommendations Unable to determine at this time   Discharge Recommendations Recommend home health for continued occupational therapy services     "

## 2020-12-18 NOTE — THERAPY
"Physical Therapy   Daily Treatment     Patient Name: Toney Watson Jr.  Age:  70 y.o., Sex:  male  Medical Record #: [id number]  Today's Date: 12/18/2020     Precautions: Fall Risk, Spinal / Back Precautions     Assessment    Patient presents with impaired balance and gait today. He was limited by pain and c/o \"unstable back\" and only able to take 5 steps before needing to return to bed. Patient is not safe for DC home at this time as he need to tolerate household ambulation w/FWW. Recommend possible use of LSO for pain control and stability. Will continue to follow for further skilled PT and patient will need HHPT for safe DC.     Plan    Continue current treatment plan.    DC Equipment Recommendations: None([facility] FWW at home )  Discharge Recommendations: Recommend home health for continued physical therapy services      Subjective    \"I'm in a lot of pain\"     Objective       12/18/20 1241   Precautions   Precautions Fall Risk;Spinal / Back Precautions    Comments no brace   Cognition    Cognition / Consciousness WDL   Level of Consciousness Lethargic   Passive ROM Lower Body   Passive ROM Lower Body WDL   Active ROM Lower Body    Active ROM Lower Body  WDL   Strength Lower Body   Lower Body Strength  WDL   Sensation Lower Body   Lower Extremity Sensation   X   Comments hx of sensory impairment, reports some improvement since surgery    Neuro-Muscular Treatments   Neuro-Muscular Treatments Facilitation;Postural Facilitation;Verbal Cuing;Tactile Cuing   Other Treatments   Other Treatments Provided provided education about DC safety and the possible use of a an LSO for support if approved by MD   Balance   Sitting Balance (Static) Fair +   Sitting Balance (Dynamic) Fair   Standing Balance (Static) Fair   Standing Balance (Dynamic) Fair -   Weight Shift Sitting Fair   Weight Shift Standing Fair   Comments w/FWW   Gait Analysis   Gait Level Of Assist Minimal Assist   Assistive Device Front Wheel Walker   Distance " "(Feet) 5   # of Times Distance was Traveled 1   Deviation Antalgic   Comments distance limited by patient reporting \"instability in spine\" and needing Axel to return to EOB    Bed Mobility    Supine to Sit Supervised   Sit to Supine Supervised   Scooting Supervised   Rolling Supervised   Skilled Intervention Tactile Cuing;Verbal Cuing  (cueing for log roll )   Comments cueing for log roll    Functional Mobility   Sit to Stand Supervised   Bed, Chair, Wheelchair Transfer Supervised   How much difficulty does the patient currently have...   Turning over in bed (including adjusting bedclothes, sheets and blankets)? 4   Sitting down on and standing up from a chair with arms (e.g., wheelchair, bedside commode, etc.) 3   Moving from lying on back to sitting on the side of the bed? 4   How much help from another person does the patient currently need...   Moving to and from a bed to a chair (including a wheelchair)? 3   Need to walk in a hospital room? 3   Climbing 3-5 steps with a railing? 2   6 clicks Mobility Score 19   Activity Tolerance   Sitting in Chair NT   Sitting Edge of Bed 2 min   Standing 2 min    Comments Limtied by pain and \"back instability\"    Short Term Goals    Short Term Goal # 1 Pt will perform bed mob via log roll at spv in 6tx to improve functional independence.    Goal Outcome # 1 Goal met   Short Term Goal # 2 Pt will perform transfers with FWW at spv in 6tx to improve functional mobility.    Goal Outcome # 2 Progressing as expected   Short Term Goal # 3 Pt will perform gait x150' wtih FWW at spv in 6tx to improve functional mobility.    Goal Outcome # 3 Progressing slower than expected   Anticipated Discharge Equipment and Recommendations   DC Equipment Recommendations None  ([facility] FWW at home )   Discharge Recommendations Recommend home health for continued physical therapy services       Rosa Jimenez, PT, DPT, [facility]  "

## 2020-12-18 NOTE — DISCHARGE SUMMARY
DATE OF ADMISSION:  12/15/2020   DATE OF DISCHARGE:  12/18/2020     DISCHARGE DIAGNOSES:  Lumbar spondylolisthesis, L4-5; lumbar degenerative disk   disease with radiculopathy, L4-L5; morbid obesity; prior lumbar laminotomies,   L4-5; connective tissue foraminal stenosis with radiculopathy, L4-5; low back   pain and right lumbar radiculopathy.     SURGERY PERFORMED:  L4-L5 transforaminal lumbar interbody fusion.     COMPLICATIONS:  None.     REASON FOR ADMISSION:  This is a 70-year-old  who had multiple lumbar   diskectomies back in the 1980s.  He presented to clinic with progressive back   pain, ambulatory intolerance, right greater than left lumbar radiculopathies.    The MRI showed severe degenerative disk disease along with a large   degenerated and herniated disk at right L4-5 with a grade I spondylolisthesis   and L4-5 anterolisthesis.  Surgery was indicated for low back pain, right   lumbar radiculopathy, right lower extremity weakness, ambulatory intolerance   in the setting of severe degenerative disk disease with large herniated disk,   grade I spondylolisthesis L4-5.     HOSPITAL COURSE:  The patient was admitted on the day of surgery.  He   underwent surgery without complication.  After recovery, he was transferred to   the neurosurgical floor.  The patient had a history of atrial fibrillation   and an EKG was done in the PACU to confirm this.  The patient's heart rate   resolved to normal by ____ the time he arrived to the floor.  The patient was   seen on postop day 1 with some right S1 paresthesias and some difficulty with   ambulation.  The patient was seen on postop day 2 and was requiring 2 liters   per minute of oxygen.  He had some persistent right greater than left S1   paresthesias and felt weak, so he did ____ physical therapy and occupational   therapy.  The patient was seen on postop day 3 with improvement in his S1   radiculopathy.  He states that he is feeling much better at this  point.  His   dressing is clean and dry.  He is neuro intact and although he does have some   hoarseness of voice, he is doing quite well.  The patient has been ambulating   on the floor.  At this point, he is cleared for discharge home after an   uneventful hospital stay.     DISCHARGE INSTRUCTIONS:  The patient is to ambulate as tolerated.  He is to   avoid pushing, pulling or lifting greater than 15 pounds.  It is okay for him   to shower, but he is not to submerge the wound.  It will be okay for him to   drive in two weeks' time if he is comfortable and not taking narcotic pain   medications.  He does have an appointment scheduled in the office of [facility] in two weeks' time.  He will contact   our office at any point should he have any questions or concerns.     DISCHARGE MEDICATIONS:  In addition to his usual home meds, the patient was   discharged home on Percocet 5/325, #84, one to two tabs p.o. q. 4 hours p.r.n.   pain and Robaxin 750 mg q.i.d. for 20 days #80 p.r.n. muscle spasm.  The   patient was also encouraged to take a daily stool softener.     All the patient's questions have been answered.  He is discharged home in   stable condition.        ______________________________  Magdalena Tom PA-C        ______________________________  MD YASMINE Mendoza/SUB    DD:  12/18/2020 09:00  DT:  12/18/2020 09:37    Job#:  [id number]

## 2020-12-18 NOTE — PROGRESS NOTES
Neurosurgery Progress Note    Subjective:  POD3 L4-5 TLIF  C/o LBP, improved since yesterday   Some persistent right > left S1 paresthesias, also improved  Minimal ambulation due to pain and stating he feels weak  Hx of afib, was in a flutter in PACU, repeat EKG stable  No drain in place    Exam:  Dressing CDI  5/5 strength to LE  Sensation equal to LE    BP  Min: 106/61  Max: 122/70  Pulse  Av  Min: 74  Max: 90  Resp  Av.8  Min: 19  Max: 20  Temp  Av.6 °C (97.8 °F)  Min: 36 °C (96.8 °F)  Max: 37.3 °C (99.2 °F)  SpO2  Av.2 %  Min: 87 %  Max: 99 %    No data recorded    Recent Labs     20  1730   WBC 11.6*   RBC 3.47*   HEMOGLOBIN 10.8*   HEMATOCRIT 33.4*   MCV 96.3   MCH 30.8   MCHC 32.0*   RDW 46.7   PLATELETCT 162*   MPV 10.4     Recent Labs     20  1730   SODIUM 133*   POTASSIUM 4.3   CHLORIDE 99   CO2 27   GLUCOSE 124*   BUN 13   CREATININE 1.06   CALCIUM 8.7               Intake/Output       20 0700 - 20 0659 20 0700 - 20 0659      [id number] 2963-9031 Total 6882-6698 0920-1115 Total       Intake    P.O.  720  -- 720  --  -- --    P.O. 720 -- 720 -- -- --    Total Intake 720  720 -- -- --       Output    Urine  --  -- --  --  -- --    Number of Times Voided 2 x -- 2 x -- -- --    Emesis  0  -- 0  --  -- --    Emesis 0 -- 0 -- -- --    Stool  --  -- --  --  -- --    Number of Times Stooled 0 x -- 0 x -- -- --    Total Output 0 -- 0 -- -- --       Net I/O     720 -- 720 -- -- --            Intake/Output Summary (Last 24 hours) at 2020 0840  Last data filed at 2020 1838  Gross per 24 hour   Intake 720 ml   Output 0 ml   Net 720 ml            • ketorolac  15 mg Q6HRS   • acetaminophen  650 mg Q4HRS PRN   • amLODIPine  2.5 mg Q EVENING   • atorvastatin  80 mg DAILY   • carvedilol  25 mg BID WITH MEALS   • digoxin  125 mcg DAILY   • DULoxetine  60 mg DAILY   • gabapentin  900 mg BID   • insulin glargine  80 Units BID   • lisinopril  40 mg DAILY   •  metformin  1,000 mg BID WITH MEALS   • zolpidem  5 mg HS PRN   • Pharmacy Consult Request  1 Each PHARMACY TO DOSE   • MD ALERT...DO NOT ADMINISTER NSAIDS or ASPIRIN unless ORDERED By Neurosurgery  1 Each PRN   • docusate sodium  100 mg BID   • senna-docusate  1 Tab Nightly   • senna-docusate  1 Tab Q24HRS PRN   • polyethylene glycol/lytes  1 Packet BID PRN   • magnesium hydroxide  30 mL QDAY PRN   • bisacodyl  10 mg Q24HRS PRN   • fleet  1 Each Once PRN   • 0.9 % NaCl with KCl 20 mEq 1,000 mL   Continuous   • oxyCODONE immediate-release  5 mg Q3HRS PRN   • oxyCODONE immediate release  10 mg Q3HRS PRN   • HYDROmorphone  0.5 mg Q3HRS PRN   • diphenhydrAMINE  25 mg Q6HRS PRN    Or   • diphenhydrAMINE  25 mg Q6HRS PRN   • ondansetron  4 mg Q4HRS PRN   • ondansetron  4 mg Q4HRS PRN   • methocarbamol  750 mg Q8HRS PRN   • ALPRAZolam  0.25 mg BID PRN   • cloNIDine  0.1 mg Q4HRS PRN   • benzocaine-menthol  1 Lozenge Q2HRS PRN   • calcium carbonate  500 mg BID   • omeprazole  20 mg DAILY   • insulin regular  3-14 Units 4X/DAY ACHS    And   • glucose  16 g Q15 MIN PRN    And   • dextrose 50%  50 mL Q15 MIN PRN       Assessment and Plan:  Hospital day #4  POD #3  PT/OT/ambulate as tolerated  Change dressing  MRI cancelled, patient neuro intact and improved since yesterday   Begin bowel prep  OK  To DC to home if cleared by PT/OT and meeting criteria (VSS, pain well controlled on oral medications, alert and oriented)   Prophylactic anticoagulation: yes         Start date/time: today

## 2020-12-18 NOTE — CARE PLAN
Problem: Safety  Goal: Will remain free from falls  Outcome: PROGRESSING AS EXPECTED  Note: Bed in lowest position, locked, upper side rails up, educated about fall risk and using treaded socks. Bed alarm in place     Problem: Infection  Goal: Will remain free from infection  Outcome: PROGRESSING AS EXPECTED  Note: Afebrile, educated about handwashing and signs of infection

## 2020-12-19 VITALS
OXYGEN SATURATION: 90 % | HEART RATE: 87 BPM | HEIGHT: 70 IN | WEIGHT: 315 LBS | RESPIRATION RATE: 19 BRPM | DIASTOLIC BLOOD PRESSURE: 63 MMHG | TEMPERATURE: 97.9 F | BODY MASS INDEX: 45.1 KG/M2 | SYSTOLIC BLOOD PRESSURE: 142 MMHG

## 2020-12-19 LAB
GLUCOSE BLD-MCNC: 51 MG/DL (ref 65–99)
GLUCOSE BLD-MCNC: 63 MG/DL (ref 65–99)
GLUCOSE BLD-MCNC: 71 MG/DL (ref 65–99)

## 2020-12-19 PROCEDURE — A9270 NON-COVERED ITEM OR SERVICE: HCPCS | Performed by: PHYSICIAN ASSISTANT

## 2020-12-19 PROCEDURE — 97530 THERAPEUTIC ACTIVITIES: CPT | Mod: CQ

## 2020-12-19 PROCEDURE — 700102 HCHG RX REV CODE 250 W/ 637 OVERRIDE(OP): Performed by: PHYSICIAN ASSISTANT

## 2020-12-19 PROCEDURE — 82962 GLUCOSE BLOOD TEST: CPT | Mod: 91

## 2020-12-19 RX ADMIN — ATORVASTATIN CALCIUM 80 MG: 40 TABLET, FILM COATED ORAL at 06:24

## 2020-12-19 RX ADMIN — CARVEDILOL 25 MG: 6.25 TABLET, FILM COATED ORAL at 09:11

## 2020-12-19 RX ADMIN — DOCUSATE SODIUM 100 MG: 100 CAPSULE ORAL at 06:24

## 2020-12-19 RX ADMIN — GABAPENTIN 900 MG: 300 CAPSULE ORAL at 06:24

## 2020-12-19 RX ADMIN — ANTACID TABLETS 500 MG: 500 TABLET, CHEWABLE ORAL at 06:24

## 2020-12-19 RX ADMIN — LISINOPRIL 40 MG: 20 TABLET ORAL at 06:24

## 2020-12-19 RX ADMIN — ACETAMINOPHEN 650 MG: 325 TABLET, FILM COATED ORAL at 09:11

## 2020-12-19 RX ADMIN — Medication 16 G: at 07:14

## 2020-12-19 RX ADMIN — OMEPRAZOLE 20 MG: 20 CAPSULE, DELAYED RELEASE ORAL at 06:24

## 2020-12-19 RX ADMIN — OXYCODONE HYDROCHLORIDE 10 MG: 10 TABLET ORAL at 09:11

## 2020-12-19 RX ADMIN — Medication 16 G: at 06:55

## 2020-12-19 RX ADMIN — DULOXETINE HYDROCHLORIDE 60 MG: 60 CAPSULE, DELAYED RELEASE ORAL at 06:24

## 2020-12-19 RX ADMIN — DIGOXIN 125 MCG: 125 TABLET ORAL at 06:24

## 2020-12-19 RX ADMIN — Medication 16 G: at 06:32

## 2020-12-19 ASSESSMENT — COGNITIVE AND FUNCTIONAL STATUS - GENERAL
CLIMB 3 TO 5 STEPS WITH RAILING: A LOT
SUGGESTED CMS G CODE MODIFIER MOBILITY: CK
MOVING FROM LYING ON BACK TO SITTING ON SIDE OF FLAT BED: A LITTLE
WALKING IN HOSPITAL ROOM: A LITTLE
MOBILITY SCORE: 19
STANDING UP FROM CHAIR USING ARMS: A LITTLE

## 2020-12-19 ASSESSMENT — PAIN DESCRIPTION - PAIN TYPE: TYPE: ACUTE PAIN;SURGICAL PAIN

## 2020-12-19 ASSESSMENT — GAIT ASSESSMENTS: GAIT LEVEL OF ASSIST: UNABLE TO PARTICIPATE

## 2020-12-19 NOTE — PROGRESS NOTES
At approximately 2300 on 12/18, pt refused oxygen monitoring, CPAP, and oxy mask. Pt was agitated at the time.     At approximately 0045 on 12/19, pt agreed to oxygen monitoring and oxy mask. On 2 L oxygen pt's oxygen saturation was in the 90s.

## 2020-12-19 NOTE — PROGRESS NOTES
Neurosurgery Progress Note    Subjective:  POD4 L4-5 TLIF  C/o LBP, improved since yesterday   Some persistent right > left S1 paresthesias, also improved  Minimal ambulation due to pain and stating he feels weak  Hx of afib, was in a flutter in PACU, repeat EKG stable  No drain in place  Was set up to go home yesterday but did not have ride  Stable overnight    Exam:  Dressing with strike through  5/5 strength to LE  Sensation equal to LE    BP  Min: 120/67  Max: 142/63  Pulse  Av.4  Min: 65  Max: 87  Resp  Av.6  Min: 16  Max: 19  Temp  Av.4 °C (97.6 °F)  Min: 36.2 °C (97.1 °F)  Max: 36.6 °C (97.9 °F)  SpO2  Av.2 %  Min: 90 %  Max: 100 %    No data recorded    Recent Labs     20  1730   WBC 11.6*   RBC 3.47*   HEMOGLOBIN 10.8*   HEMATOCRIT 33.4*   MCV 96.3   MCH 30.8   MCHC 32.0*   RDW 46.7   PLATELETCT 162*   MPV 10.4     Recent Labs     20  1730   SODIUM 133*   POTASSIUM 4.3   CHLORIDE 99   CO2 27   GLUCOSE 124*   BUN 13   CREATININE 1.06   CALCIUM 8.7               Intake/Output       20 0700 - 20 0659 20 0700 - 20 0659      [id number] 2361-7337 Total 0647-2428 2477-4929 Total       Intake    P.O.  480  -- 480  240  -- 240    P.O. 480 -- 480 240 -- 240    Total Intake 480 -- 480 240 -- 240       Output    Stool  --  -- --  --  -- --    Number of Times Stooled -- 1 x 1 x 1 x -- 1 x    Total Output -- -- -- -- -- --       Net I/O     480 -- 480 240 -- 240            Intake/Output Summary (Last 24 hours) at 2020 1028  Last data filed at 2020 0914  Gross per 24 hour   Intake 480 ml   Output --   Net 480 ml            • acetaminophen  650 mg Q4HRS PRN   • amLODIPine  2.5 mg Q EVENING   • atorvastatin  80 mg DAILY   • carvedilol  25 mg BID WITH MEALS   • digoxin  125 mcg DAILY   • DULoxetine  60 mg DAILY   • gabapentin  900 mg BID   • insulin glargine  80 Units BID   • lisinopril  40 mg DAILY   • metformin  1,000 mg BID WITH MEALS   • zolpidem  5 mg HS  PRN   • Pharmacy Consult Request  1 Each PHARMACY TO DOSE   • MD ALERT...DO NOT ADMINISTER NSAIDS or ASPIRIN unless ORDERED By Neurosurgery  1 Each PRN   • docusate sodium  100 mg BID   • senna-docusate  1 Tab Nightly   • senna-docusate  1 Tab Q24HRS PRN   • polyethylene glycol/lytes  1 Packet BID PRN   • magnesium hydroxide  30 mL QDAY PRN   • bisacodyl  10 mg Q24HRS PRN   • fleet  1 Each Once PRN   • 0.9 % NaCl with KCl 20 mEq 1,000 mL   Continuous   • oxyCODONE immediate-release  5 mg Q3HRS PRN   • oxyCODONE immediate release  10 mg Q3HRS PRN   • HYDROmorphone  0.5 mg Q3HRS PRN   • diphenhydrAMINE  25 mg Q6HRS PRN    Or   • diphenhydrAMINE  25 mg Q6HRS PRN   • ondansetron  4 mg Q4HRS PRN   • ondansetron  4 mg Q4HRS PRN   • methocarbamol  750 mg Q8HRS PRN   • ALPRAZolam  0.25 mg BID PRN   • cloNIDine  0.1 mg Q4HRS PRN   • benzocaine-menthol  1 Lozenge Q2HRS PRN   • calcium carbonate  500 mg BID   • omeprazole  20 mg DAILY   • insulin regular  3-14 Units 4X/DAY ACHS    And   • glucose  16 g Q15 MIN PRN    And   • dextrose 50%  50 mL Q15 MIN PRN       Assessment and Plan:  Hospital day #5  POD #4  Dc home  Rx on chart  Return precautions discussed  Prophylactic anticoagulation: yes         Start date/time: today

## 2020-12-19 NOTE — DISCHARGE INSTRUCTIONS
Discharge Instructions    Discharged to home by car with relative. Discharged via wheelchair, hospital escort: Yes.  Special equipment needed: Lumbar sacral orthosis (LSO) brace. (Patient has Front Wheel Walker at home)    Discharge Plan: Be sure to schedule a follow-up appointment with your primary care doctor or any specialists as instructed.     - You may ambulate as tolerated.    - Avoid pushing, pulling or lifting greater than 15 pounds.    - It is okay for you to shower, but do not submerge the wound.   - It will be okay for you to drive in two weeks' time if you are comfortable and not taking narcotic pain medications.    - You have an appointment scheduled in the office of [facility] in two weeks' time.    - Contact our office at any point should you have any questions or concerns. [phone]    Influenza Vaccine Indication: Not indicated: Previously immunized this influenza season and > 8 years of age    Diet Instructions: I understand that a diet low in cholesterol, fat, and sodium is recommended for good health. Unless I have been given specific instructions below for another diet, I accept this instruction as my diet prescription.   Other diet: Diabetic    Special Instructions: None    · Is patient discharged on Warfarin / Coumadin?   No   Depression / Suicide Risk    As you are discharged from this [facility] facility, it is important to learn how to keep safe from harming yourself.    Recognize the warning signs:  · Abrupt changes in personality, positive or negative- including increase in energy   · Giving away possessions  · Change in eating patterns- significant weight changes-  positive or negative  · Change in sleeping patterns- unable to sleep or sleeping all the time   · Unwillingness or inability to communicate  · Depression  · Unusual sadness, discouragement and loneliness  · Talk of wanting to die  · Neglect of personal  appearance   · Rebelliousness- reckless behavior  · Withdrawal from people/activities they love  · Confusion- inability to concentrate     If you or a loved one observes any of these behaviors or has concerns about self-harm, here's what you can do:  · Talk about it- your feelings and reasons for harming yourself  · Remove any means that you might use to hurt yourself (examples: pills, rope, extension cords, firearm)  · Get professional help from the community (Mental Health, Substance Abuse, psychological counseling)  · Do not be alone:Call your Safe Contact- someone whom you trust who will be there for you.  · Call your local CRISIS HOTLINE 715-2451 or 373-894-1075  · Call your local Children's Mobile Crisis Response Team [place] (206) 638-8812 or www.LightSquared  · Call the toll free National Suicide Prevention Hotlines   · National Suicide Prevention Lifeline 145-427-AVAE (6843)  · [employer] Line Network 800-SUICIDE (701-7530)    Spinal Fusion, Adult, Care After  This sheet gives you information about how to care for yourself after your procedure. Your doctor may also give you more specific instructions. If you have problems or questions, contact your doctor.  Follow these instructions at home:  Medicines  · Take over-the-counter and prescription medicines only as told by your doctor. These include any medicines for pain or blood-thinning medicines (anticoagulants).  · If you were prescribed an antibiotic medicine, take it as told by your doctor. Do not stop taking the antibiotic even if you start to feel better.  · Do not drive for 24 hours if you were given a medicine to help you relax (sedative) during your procedure.  · Do not drive or use heavy machinery while taking prescription pain medicine.  If you have a brace:  · Wear the brace as told by your doctor. Take it off only as told by your doctor.  · Keep the brace clean.  Managing pain, stiffness, and swelling  · If directed, put ice on the  surgery area:  ? If you have a removable brace, take it off as told by your doctor.  ? Put ice in a plastic bag.  ? Place a towel between your skin and the bag.  ? Leave the ice on for 20 minutes, 2-3 times a day.  Surgery cut care    · Follow instructions from your doctor about how to take care of your cut from surgery (incision). Make sure you:  ? Wash your hands with soap and water before you change your bandage (dressing). If you cannot use soap and water, use hand .  ? Change your bandage as told by your doctor.  ? Leave stitches (sutures), skin glue, or skin tape (adhesive) strips in place. They may need to stay in place for 2 weeks or longer. If tape strips get loose and curl up, you may trim the loose edges. Do not remove tape strips completely unless your doctor says it is okay.  · Keep your cut from surgery clean and dry.  ? Do not take baths, swim, or use a hot tub until your doctor says it is okay.  ? Ask your doctor if you can take showers. You may only be allowed to take sponge baths.  · Every day, check your cut from surgery and the area around it for:  ? More redness, swelling, or pain.  ? Fluid or blood.  ? Warmth.  ? Pus or a bad smell.  · If you have a drain tube, follow instructions from your doctor about caring for it. Do not take out the drain tube or any bandages unless your doctor says it is okay.  Physical activity  · Rest and protect your back as much as possible.  · Follow instructions from your doctor about how to move. Use good posture to help your spine heal.  · Do not lift anything that is heavier than 8 lb (3.6 kg), or the limit that you are told, until your doctor says that it is safe.  · Do not twist or bend at the waist until your doctor says it is okay.  · It is best if you:  ? Do not make pushing and pulling motions.  ? Do not sit or lie down in the same position for a long time.  ? Do not raise your hands or arms above your head.  · Return to your normal activities as  told by your doctor. Ask your doctor what activities are safe for you. Rest and protect your back as much as you can.  · Do not start to exercise until your doctor says it is okay. Ask your doctor what kinds of exercise you can do to make your back stronger.  General instructions  · To prevent blood clots and lessen swelling in your legs:  ? Wear compression stockings as told.  ? Walk one or more times every few hours as told by your doctor.  · Do not use any products that contain nicotine or tobacco, such as cigarettes and e-cigarettes. These can delay bone healing. If you need help quitting, ask your doctor.  · To prevent or treat constipation while you are taking prescription pain medicine, your doctor may suggest that you:  ? Drink enough fluid to keep your pee (urine) pale yellow.  ? Take over-the-counter or prescription medicines.  ? Eat foods that are high in fiber. These include fresh fruits and vegetables, whole grains, and beans.  ? Limit foods that are high in fat and processed sugars, such as fried and sweet foods.  · Keep all follow-up visits as told by your doctor. This is important.  Contact a doctor if:  · Your pain gets worse.  · Your medicine does not help your pain.  · Your legs or feet get painful or swollen.  · Your cut from surgery is more red, swollen, or painful.  · Your cut from surgery feels warm to the touch.  · You have:  ? Fluid or blood coming from your cut from surgery.  ? Pus or a bad smell coming from your cut from surgery.  ? A fever.  ? Weakness or loss of feeling (numbness) in your legs that is new or getting worse.  ? Trouble controlling when you pee (urinate) or poop (have a bowel movement).  · You feel sick to your stomach (nauseous).  · You throw up (vomit).  Get help right away if:  · Your pain is very bad.  · You have chest pain.  · You have trouble breathing.  · You start to have a cough.  These symptoms may be an emergency. Do not wait to see if the symptoms will go away.  Get medical help right away. Call your local emergency services (911 in the [place] Do not drive yourself to the hospital.  Summary  · After the procedure, it is common to have pain in your back and pain by your surgery cut(s).  · Icing and pain medicines may help to control the pain. Follow directions from your doctor.  · Rest and protect your back as much as possible. Do not twist or bend at the waist.  · Get up and walk one or more times every few hours as told by your doctor.  This information is not intended to replace advice given to you by your health care provider. Make sure you discuss any questions you have with your health care provider.  Document Released: 04/12/2012 Document Revised: 04/09/2020 Document Reviewed: 04/03/2018  Elsevier Patient Education © 2020 [employer]    Acetaminophen; Oxycodone tablets  What is this medicine?  ACETAMINOPHEN; OXYCODONE (a set a JANNETH milly fen; ox i KOE done) is a pain reliever. It is used to treat moderate to severe pain.  This medicine may be used for other purposes; ask your health care provider or pharmacist if you have questions.  COMMON BRAND NAME(S): Endocet, Magnacet, Nalocet, Narvox, Percocet, Perloxx, Primalev, Primlev, Roxicet, Xolox  What should I tell my health care provider before I take this medicine?  They need to know if you have any of these conditions:  · brain tumor  · Crohn's disease, inflammatory bowel disease, or ulcerative colitis  · drug abuse or addiction  · head injury  · heart or circulation problems  · if you often drink alcohol  · kidney disease or problems going to the bathroom  · liver disease  · lung disease, asthma, or breathing problems  · an unusual or allergic reaction to acetaminophen, oxycodone, other opioid analgesics, other medicines, foods, dyes, or preservatives  · pregnant or trying to get pregnant  · breast-feeding  How should I use this medicine?  Take this medicine by mouth with a full glass of water. Follow the directions  on the prescription label. You can take it with or without food. If it upsets your stomach, take it with food. Take your medicine at regular intervals. Do not take it more often than directed.  A special MedGuide will be given to you by the pharmacist with each prescription and refill. Be sure to read this information carefully each time.  Talk to your pediatrician regarding the use of this medicine in children. Special care may be needed.  Overdosage: If you think you have taken too much of this medicine contact a poison control center or emergency room at once.  NOTE: This medicine is only for you. Do not share this medicine with others.  What if I miss a dose?  If you miss a dose, take it as soon as you can. If it is almost time for your next dose, take only that dose. Do not take double or extra doses.  What may interact with this medicine?  This medicine may interact with the following medications:  · alcohol  · antihistamines for allergy, cough and cold  · antiviral medicines for HIV or AIDS  · atropine  · certain antibiotics like clarithromycin, erythromycin, linezolid, rifampin  · certain medicines for anxiety or sleep  · certain medicines for bladder problems like oxybutynin, tolterodine  · certain medicines for depression like amitriptyline, fluoxetine, sertraline  · certain medicines for fungal infections like ketoconazole, itraconazole, voriconazole  · certain medicines for migraine headache like almotriptan, eletriptan, frovatriptan, naratriptan, rizatriptan, sumatriptan, zolmitriptan  · certain medicines for nausea or vomiting like dolasetron, ondansetron, palonosetron  · certain medicines for Parkinson's disease like benztropine, trihexyphenidyl  · certain medicines for seizures like phenobarbital, phenytoin, primidone  · certain medicines for stomach problems like dicyclomine, hyoscyamine  · certain medicines for travel sickness like scopolamine  · diuretics  · general anesthetics like halothane,  isoflurane, methoxyflurane, propofol  · ipratropium  · local anesthetics like lidocaine, pramoxine, tetracaine  · MAOIs like Carbex, Eldepryl, Marplan, Nardil, and Parnate  · medicines that relax muscles for surgery  · methylene blue  · nilotinib  · other medicines with acetaminophen  · other narcotic medicines for pain or cough  · phenothiazines like chlorpromazine, mesoridazine, prochlorperazine, thioridazine  This list may not describe all possible interactions. Give your health care provider a list of all the medicines, herbs, non-prescription drugs, or dietary supplements you use. Also tell them if you smoke, drink alcohol, or use illegal drugs. Some items may interact with your medicine.  What should I watch for while using this medicine?  Tell your doctor or health care professional if your pain does not go away, if it gets worse, or if you have new or a different type of pain. You may develop tolerance to the medicine. Tolerance means that you will need a higher dose of the medication for pain relief. Tolerance is normal and is expected if you take this medicine for a long time.  Do not suddenly stop taking your medicine because you may develop a severe reaction. Your body becomes used to the medicine. This does NOT mean you are addicted. Addiction is a behavior related to getting and using a drug for a non-medical reason. If you have pain, you have a medical reason to take pain medicine. Your doctor will tell you how much medicine to take. If your doctor wants you to stop the medicine, the dose will be slowly lowered over time to avoid any side effects.  There are different types of narcotic medicines (opiates). If you take more than one type at the same time or if you are taking another medicine that also causes drowsiness, you may have more side effects. Give your health care provider a list of all medicines you use. Your doctor will tell you how much medicine to take. Do not take more medicine than  directed. Call emergency for help if you have problems breathing or unusual sleepiness.  Do not take other medicines that contain acetaminophen with this medicine. Always read labels carefully. If you have questions, ask your doctor or pharmacist.  If you take too much acetaminophen get medical help right away. Too much acetaminophen can be very dangerous and cause liver damage. Even if you do not have symptoms, it is important to get help right away.  You may get drowsy or dizzy. Do not drive, use machinery, or do anything that needs mental alertness until you know how this medicine affects you. Do not stand or sit up quickly, especially if you are an older patient. This reduces the risk of dizzy or fainting spells. Alcohol may interfere with the effect of this medicine. Avoid alcoholic drinks.  The medicine will cause constipation. Try to have a bowel movement at least every 2 to 3 days. If you do not have a bowel movement for 3 days, call your doctor or health care professional.  Your mouth may get dry. Chewing sugarless gum or sucking hard candy, and drinking plenty or water may help. Contact your doctor if the problem does not go away or is severe.  What side effects may I notice from receiving this medicine?  Side effects that you should report to your doctor or health care professional as soon as possible:  · allergic reactions like skin rash, itching or hives, swelling of the face, lips, or tongue  · breathing problems  · confusion  · redness, blistering, peeling or loosening of the skin, including inside the mouth  · signs and symptoms of liver injury like dark yellow or brown urine; general ill feeling or flu-like symptoms; light-colored stools; loss of appetite; nausea; right upper belly pain; unusually weak or tired; yellowing of the eyes or skin  · signs and symptoms of low blood pressure like dizziness; feeling faint or lightheaded, falls; unusually weak or tired  · trouble passing urine or change in  the amount of urine  Side effects that usually do not require medical attention (report to your doctor or health care professional if they continue or are bothersome):  · constipation  · dry mouth  · nausea, vomiting  · tiredness  This list may not describe all possible side effects. Call your doctor for medical advice about side effects. You may report side effects to FDA at 5-706-NFM-5953.  Where should I keep my medicine?  Keep out of the reach of children. This medicine can be abused. Keep your medicine in a safe place to protect it from theft. Do not share this medicine with anyone. Selling or giving away this medicine is dangerous and against the law.  Store at room temperature between 20 and 25 degrees C (68 and 77 degrees F).  This medicine may cause harm and death if it is taken by other adults, children, or pets. Return medicine that has not been used to an official disposal site. Contact the [facility] at [phone] or your [facility] government to find a site. If you cannot return the medicine, flush it down the toilet. Do not use the medicine after the expiration date.  NOTE: This sheet is a summary. It may not cover all possible information. If you have questions about this medicine, talk to your doctor, pharmacist, or health care provider.  © 2020 Elsevier/Gold Standard (2018-04-24 15:46:38)    Methocarbamol tablets  What is this medicine?  METHOCARBAMOL (meth oh EULOGIO ba mole) helps to relieve pain and stiffness in muscles caused by strains, sprains, or other injury to your muscles.  This medicine may be used for other purposes; ask your health care provider or pharmacist if you have questions.  COMMON BRAND NAME(S): Robaxin  What should I tell my health care provider before I take this medicine?  They need to know if you have any of these conditions:  · kidney disease  · seizures  · an unusual or allergic reaction to methocarbamol, other medicines, foods, dyes, or preservatives  · pregnant or trying to  get pregnant  · breast-feeding  How should I use this medicine?  Take this medicine by mouth with a full glass of water. Follow the directions on the prescription label. Take your medicine at regular intervals. Do not take your medicine more often than directed.  Talk to your pediatrician regarding the use of this medicine in children. Special care may be needed.  Overdosage: If you think you have taken too much of this medicine contact a poison control center or emergency room at once.  NOTE: This medicine is only for you. Do not share this medicine with others.  What if I miss a dose?  If you miss a dose, take it as soon as you can. If it is almost time for your next dose, take only the next dose. Do not take double or extra doses.  What may interact with this medicine?  Do not take this medication with any of the following medicines:  · narcotic medicines for cough  This medicine may also interact with the following medications:  · alcohol  · antihistamines for allergy, cough and cold  · certain medicines for anxiety or sleep  · certain medicines for depression like amitriptyline, fluoxetine, sertraline  · certain medicines for seizures like phenobarbital, primidone  · cholinesterase inhibitors like neostigmine, ambenonium, and pyridostigmine bromide  · general anesthetics like halothane, isoflurane, methoxyflurane, propofol  · local anesthetics like lidocaine, pramoxine, tetracaine  · medicines that relax muscles for surgery  · narcotic medicines for pain  · phenothiazines like chlorpromazine, mesoridazine, prochlorperazine, thioridazine  This list may not describe all possible interactions. Give your health care provider a list of all the medicines, herbs, non-prescription drugs, or dietary supplements you use. Also tell them if you smoke, drink alcohol, or use illegal drugs. Some items may interact with your medicine.  What should I watch for while using this medicine?  Tell your doctor or health care  professional if your symptoms do not start to get better or if they get worse.  You may get drowsy or dizzy. Do not drive, use machinery, or do anything that needs mental alertness until you know how this medicine affects you. Do not stand or sit up quickly, especially if you are an older patient. This reduces the risk of dizzy or fainting spells. Alcohol may interfere with the effect of this medicine. Avoid alcoholic drinks.  If you are taking another medicine that also causes drowsiness, you may have more side effects. Give your health care provider a list of all medicines you use. Your doctor will tell you how much medicine to take. Do not take more medicine than directed. Call emergency for help if you have problems breathing or unusual sleepiness.  What side effects may I notice from receiving this medicine?  Side effects that you should report to your doctor or health care professional as soon as possible:  · allergic reactions like skin rash, itching or hives, swelling of the face, lips, or tongue  · breathing problems  · confusion  · seizures  · unusually weak or tired  Side effects that usually do not require medical attention (report to your doctor or health care professional if they continue or are bothersome):  · dizziness  · headache  · metallic taste  · tiredness  · upset stomach  This list may not describe all possible side effects. Call your doctor for medical advice about side effects. You may report side effects to FDA at 5-802-FDA-2768.  Where should I keep my medicine?  Keep out of the reach of children.  Store at room temperature between 20 and 25 degrees C (68 and 77 degrees F). Keep container tightly closed. Throw away any unused medicine after the expiration date.  NOTE: This sheet is a summary. It may not cover all possible information. If you have questions about this medicine, talk to your doctor, pharmacist, or health care provider.  © 2020 Elsevier/[employer] Standard (2016-09-27 13:11:54)

## 2020-12-19 NOTE — THERAPY
"Physical Therapy   Daily Treatment     Patient Name: Toney Watson Jr.  Age:  70 y.o., Sex:  male  Medical Record #: [id number]  Today's Date: 12/19/2020     Precautions: Fall Risk, Spinal / Back Precautions     Assessment    PT pleasant upon arrival but did express frustrations to therapist about pain and being here. Extensive discussion regarding mobility and pain management during todays tx session. He performed log rolling multiple times to don pant sin be per pt preference. HE reached EOB with spv and bed rail. Once EOb in middle of conversation pt laid self back supine stating its \"painful\". He proceeded to EOB second time and performed the same thing. Third time he reached EOB and donned LSO brace and prior to standing again laid back supine. Reporting it was the pain was too much. Discussed safety with discharge home with pain and he states \" I can manage it at home just fine\". He finally was able to stand one time and perform side steps and lateral scooting along EOB. He deferred gait training at this time. Encouraged to mobilize with nursing staff later. Pt mainly seems limited by pain at this time.     Plan    Continue current treatment plan.    DC Equipment Recommendations: None([place] FWW at home )  Discharge Recommendations: Recommend home health for continued physical therapy services if pain is managed.       12/19/20 1000   Other Treatments   Other Treatments Provided REviewed donning and doffing LSO. Pt had questions regarding pain managementa nd mobility. Educated on importance of mobility and options for pain management.    Balance   Sitting Balance (Static) Fair +   Sitting Balance (Dynamic) Fair   Standing Balance (Static) Fair   Standing Balance (Dynamic) Fair -   Weight Shift Sitting Fair   Weight Shift Standing Fair   Skilled Intervention Verbal Cuing   Comments with fww   Gait Analysis   Gait Level Of Assist Unable to Participate   Comments limited by pain continued to lay self back to " supine.    Bed Mobility    Supine to Sit Supervised   Sit to Supine Supervised   Scooting Supervised   Rolling Supervised   Comments cues for log rolling, utilizing bed rail but hob flat.    Functional Mobility   Sit to Stand Supervised   Bed, Chair, Wheelchair Transfer Refused   Comments STS one time with no AD, performed multiple lateral scooting along EOB.    Short Term Goals    Short Term Goal # 2 Pt will perform transfers with FWW at spv in 6tx to improve functional mobility.    Goal Outcome # 2 Goal not met   Short Term Goal # 3 Pt will perform gait x150' wtih FWW at spv in 6tx to improve functional mobility.    Goal Outcome # 3 Goal not met

## 2020-12-19 NOTE — CARE PLAN
Problem: Safety  Goal: Will remain free from injury  Outcome: PROGRESSING AS EXPECTED  Pt educated to call for assistance. Bed alarm in use.       Problem: Mobility  Goal: Risk for activity intolerance will decrease  Outcome: PROGRESSING AS EXPECTED  Pt ambulated to bathroom with staff. Became dizzy while on toilet. Vital signs were reassessed and pt was assisted back to bed with 3 staff members present and use of front wheel walker. Pt returned safely back to bed with bed alarm turned on. Educated to all for assistance before getting up.

## 2020-12-19 NOTE — DISCHARGE SUMMARY
DATE OF ADMISSION:  12/15/2020   DATE OF DISCHARGE:  12/19/2020     ADDENDUM     DISCHARGE DIAGNOSES:  1.  Lumbar spondylolisthesis, L4-L5.  2.  Lumbar degenerative disk disease with radiculopathy, L4-L5.  3.  Morbid obesity.  4.  Prior lumbar laminotomies, L4-L5.  5.  Lumbar radiculopathy.  6.  Low back pain.     SURGERY PERFORMED:  L4-L5 transforaminal lumbar interbody fusion.     HOSPITAL COURSE:  Please see discharge summary from yesterday for complete   details.  The patient was kept overnight as his ride was not available for   pickup.  He has been stable overnight with stable vital signs and mobilizing   well.  He has been cleared for discharge by physical therapy.  He has received   an LSO brace with extension.  At the time of discharge, vital signs are   stable.  He is afebrile.  He has had 2 bowel movement.  His pain is well   controlled.  Discharge prescriptions are on his chart.        ______________________________  JAZMYNE Patricia/CYNDI    DD:  12/19/2020 10:31  DT:  12/19/2020 11:01    Job#:  [id number]

## 2020-12-19 NOTE — PROGRESS NOTES
Pt. Is ambulating with LSO brace for comfort, on Room Air, Pain is controlled. Per pt. Transportation can't be here tonight. Will be here in the AM to  pt. For D/C    Updated Neurosurgery MARVIN Tom

## 2020-12-19 NOTE — PROGRESS NOTES
"- Patient discharged to home via car with relative. Patient transported via wheelchair with Tnaika RN escort to [facility] level exit.   - AVS reviewed, signed and dated by patient.   - Controlled Substance contract reviewed, signed and dated by patient.   - Two paper prescriptions handed to patient.   - Patient's PIV removed.   - DME include: LSO. Patient has FWW at home.  - Patient understands need for follow-up care and understands instructions to attend appointment with surgical team.  - Patient is A+O x 4 and verbalized understanding of instructions. Vital signs stable and patient is content with care and plan for discharge. All belongings collected by patient and discharged with patient.    /63   Pulse 87   Temp 36.6 °C (97.9 °F) (Temporal)   Resp 19   Ht 1.778 m (5' 10\")   Wt (!) 144.3 kg (318 lb 2 oz)   SpO2 90%     "

## 2020-12-19 NOTE — PROGRESS NOTES
Voalted MARVIN Tom about pt. Fluids.   Okay to order one time dose of NS at 75 ml/hr and D/C once completed per MARVIN Tom. D/C Toradol per MARVIN Tom

## 2020-12-28 NOTE — OP REPORT
DATE OF SERVICE:  12/15/2020     ADDENDUM:     SURGEON:  Alberto Aguirre MD     FIRST ASSISTANT:  Erin Champion PA-C     ANESTHESIOLOGIST:  Royal Vaz DO     ANESTHESIA:  GETA.     This addendum addresses posterolateral arthrodesis/fusion at L4-L5.  After the   prebent 50 mm jamey was placed through the tulip heads of the right L4 and   right L5 pedicle screws, it was secured in place with locking cap mechanisms   and torqued to specification x2.  Percutaneous towers were removed.  The   posterolateral gutters, right L4 and right L5 were decorticated with a high   speed Midas Jhoan drill.  The bone that had been harvested from the laminectomy   and transpedicular approach was cleaned, morselized and combined with   morselized allograft, NanoBone placed in the posterolateral gutter on the   right L4, right L5 for posterolateral arthrodesis/fusion.  AP and lateral   fluoroscopy confirmed good hardware placement in the appropriate surgical   level.  Wounds were copiously irrigated.  Surgiflo was used in the epidural   space for hemostasis as the wound was dry, no drain was placed.  Two grams of   vancomycin powder was infiltrated deep between the wounds.  The wounds were   meticulously closed in layers.  Steri-Strips were applied to the skin edges.    Sterile dressing was applied.  The patient was extubated in the operating room   and taken to PACU in stable condition.     ESTIMATED BLOOD LOSS:  50 mL.     COMPLICATIONS:  None.        ______________________________  Alberto Aguirre MD SA/YESENIA/ANABELL    DD:  12/28/2020 10:05  DT:  12/28/2020 10:35    Job#:  [id number]

## 2021-10-05 ENCOUNTER — HOSPITAL ENCOUNTER (OUTPATIENT)
Dept: RADIOLOGY | Facility: MEDICAL CENTER | Age: 71
End: 2021-10-05
Attending: NEUROLOGICAL SURGERY | Admitting: NEUROLOGICAL SURGERY
Payer: COMMERCIAL

## 2021-10-05 ENCOUNTER — PRE-ADMISSION TESTING (OUTPATIENT)
Dept: ADMISSIONS | Facility: MEDICAL CENTER | Age: 71
End: 2021-10-05
Attending: NEUROLOGICAL SURGERY
Payer: COMMERCIAL

## 2021-10-05 DIAGNOSIS — Z01.812 PRE-OPERATIVE LABORATORY EXAMINATION: ICD-10-CM

## 2021-10-05 DIAGNOSIS — Z01.810 PRE-OPERATIVE CARDIOVASCULAR EXAMINATION: ICD-10-CM

## 2021-10-05 DIAGNOSIS — Z01.811 PRE-OPERATIVE RESPIRATORY EXAMINATION: ICD-10-CM

## 2021-10-05 LAB
ANION GAP SERPL CALC-SCNC: 13 MMOL/L (ref 7–16)
APPEARANCE UR: CLEAR
APTT PPP: 40.1 SEC (ref 24.7–36)
BASOPHILS # BLD AUTO: 0.6 % (ref 0–1.8)
BASOPHILS # BLD: 0.06 K/UL (ref 0–0.12)
BILIRUB UR QL STRIP.AUTO: NEGATIVE
BUN SERPL-MCNC: 22 MG/DL (ref 8–22)
CALCIUM SERPL-MCNC: 9.6 MG/DL (ref 8.5–10.5)
CHLORIDE SERPL-SCNC: 107 MMOL/L (ref 96–112)
CO2 SERPL-SCNC: 20 MMOL/L (ref 20–33)
COLOR UR: YELLOW
CREAT SERPL-MCNC: 1.33 MG/DL (ref 0.5–1.4)
EKG IMPRESSION: NORMAL
EOSINOPHIL # BLD AUTO: 0.22 K/UL (ref 0–0.51)
EOSINOPHIL NFR BLD: 2.2 % (ref 0–6.9)
ERYTHROCYTE [DISTWIDTH] IN BLOOD BY AUTOMATED COUNT: 48.9 FL (ref 35.9–50)
EST. AVERAGE GLUCOSE BLD GHB EST-MCNC: 212 MG/DL
GLUCOSE SERPL-MCNC: 185 MG/DL (ref 65–99)
GLUCOSE UR STRIP.AUTO-MCNC: >=1000 MG/DL
HBA1C MFR BLD: 9 % (ref 4–5.6)
HCT VFR BLD AUTO: 40.9 % (ref 42–52)
HGB BLD-MCNC: 13.6 G/DL (ref 14–18)
IMM GRANULOCYTES # BLD AUTO: 0.04 K/UL (ref 0–0.11)
IMM GRANULOCYTES NFR BLD AUTO: 0.4 % (ref 0–0.9)
INR PPP: 1.33 (ref 0.87–1.13)
KETONES UR STRIP.AUTO-MCNC: NEGATIVE MG/DL
LEUKOCYTE ESTERASE UR QL STRIP.AUTO: NEGATIVE
LYMPHOCYTES # BLD AUTO: 3.64 K/UL (ref 1–4.8)
LYMPHOCYTES NFR BLD: 35.8 % (ref 22–41)
MCH RBC QN AUTO: 31.6 PG (ref 27–33)
MCHC RBC AUTO-ENTMCNC: 33.3 G/DL (ref 33.7–35.3)
MCV RBC AUTO: 95.1 FL (ref 81.4–97.8)
MICRO URNS: ABNORMAL
MONOCYTES # BLD AUTO: 1.01 K/UL (ref 0–0.85)
MONOCYTES NFR BLD AUTO: 9.9 % (ref 0–13.4)
NEUTROPHILS # BLD AUTO: 5.21 K/UL (ref 1.82–7.42)
NEUTROPHILS NFR BLD: 51.1 % (ref 44–72)
NITRITE UR QL STRIP.AUTO: NEGATIVE
NRBC # BLD AUTO: 0 K/UL
NRBC BLD-RTO: 0 /100 WBC
PH UR STRIP.AUTO: 5.5 [PH] (ref 5–8)
PLATELET # BLD AUTO: 252 K/UL (ref 164–446)
PMV BLD AUTO: 10.5 FL (ref 9–12.9)
POTASSIUM SERPL-SCNC: 5 MMOL/L (ref 3.6–5.5)
PROT UR QL STRIP: NEGATIVE MG/DL
PROTHROMBIN TIME: 16.1 SEC (ref 12–14.6)
RBC # BLD AUTO: 4.3 M/UL (ref 4.7–6.1)
RBC UR QL AUTO: NEGATIVE
SARS-COV-2 RNA RESP QL NAA+PROBE: NOTDETECTED
SODIUM SERPL-SCNC: 140 MMOL/L (ref 135–145)
SP GR UR STRIP.AUTO: 1.03
SPECIMEN SOURCE: NORMAL
UROBILINOGEN UR STRIP.AUTO-MCNC: 0.2 MG/DL
WBC # BLD AUTO: 10.2 K/UL (ref 4.8–10.8)

## 2021-10-05 PROCEDURE — 85730 THROMBOPLASTIN TIME PARTIAL: CPT

## 2021-10-05 PROCEDURE — 80048 BASIC METABOLIC PNL TOTAL CA: CPT

## 2021-10-05 PROCEDURE — U0005 INFEC AGEN DETEC AMPLI PROBE: HCPCS

## 2021-10-05 PROCEDURE — 85610 PROTHROMBIN TIME: CPT

## 2021-10-05 PROCEDURE — 85025 COMPLETE CBC W/AUTO DIFF WBC: CPT

## 2021-10-05 PROCEDURE — 81003 URINALYSIS AUTO W/O SCOPE: CPT

## 2021-10-05 PROCEDURE — 93010 ELECTROCARDIOGRAM REPORT: CPT | Performed by: INTERNAL MEDICINE

## 2021-10-05 PROCEDURE — C9803 HOPD COVID-19 SPEC COLLECT: HCPCS

## 2021-10-05 PROCEDURE — 36415 COLL VENOUS BLD VENIPUNCTURE: CPT

## 2021-10-05 PROCEDURE — 83036 HEMOGLOBIN GLYCOSYLATED A1C: CPT

## 2021-10-05 PROCEDURE — U0003 INFECTIOUS AGENT DETECTION BY NUCLEIC ACID (DNA OR RNA); SEVERE ACUTE RESPIRATORY SYNDROME CORONAVIRUS 2 (SARS-COV-2) (CORONAVIRUS DISEASE [COVID-19]), AMPLIFIED PROBE TECHNIQUE, MAKING USE OF HIGH THROUGHPUT TECHNOLOGIES AS DESCRIBED BY CMS-2020-01-R: HCPCS

## 2021-10-05 PROCEDURE — 93005 ELECTROCARDIOGRAM TRACING: CPT

## 2021-10-05 PROCEDURE — 71045 X-RAY EXAM CHEST 1 VIEW: CPT

## 2021-10-13 ENCOUNTER — APPOINTMENT (OUTPATIENT)
Dept: RADIOLOGY | Facility: MEDICAL CENTER | Age: 71
End: 2021-10-13
Attending: NEUROLOGICAL SURGERY
Payer: COMMERCIAL

## 2021-10-13 ENCOUNTER — ANESTHESIA (OUTPATIENT)
Dept: SURGERY | Facility: MEDICAL CENTER | Age: 71
End: 2021-10-13
Payer: COMMERCIAL

## 2021-10-13 ENCOUNTER — ANESTHESIA EVENT (OUTPATIENT)
Dept: SURGERY | Facility: MEDICAL CENTER | Age: 71
End: 2021-10-13
Payer: COMMERCIAL

## 2021-10-13 ENCOUNTER — HOSPITAL ENCOUNTER (OUTPATIENT)
Facility: MEDICAL CENTER | Age: 71
End: 2021-10-14
Attending: NEUROLOGICAL SURGERY | Admitting: NEUROLOGICAL SURGERY
Payer: COMMERCIAL

## 2021-10-13 LAB
GLUCOSE BLD-MCNC: 126 MG/DL (ref 65–99)
GLUCOSE BLD-MCNC: 180 MG/DL (ref 65–99)

## 2021-10-13 PROCEDURE — 700101 HCHG RX REV CODE 250: Performed by: NEUROLOGICAL SURGERY

## 2021-10-13 PROCEDURE — 72020 X-RAY EXAM OF SPINE 1 VIEW: CPT

## 2021-10-13 PROCEDURE — 700111 HCHG RX REV CODE 636 W/ 250 OVERRIDE (IP): Performed by: ANESTHESIOLOGY

## 2021-10-13 PROCEDURE — 160035 HCHG PACU - 1ST 60 MINS PHASE I: Performed by: NEUROLOGICAL SURGERY

## 2021-10-13 PROCEDURE — 700105 HCHG RX REV CODE 258: Performed by: NEUROLOGICAL SURGERY

## 2021-10-13 PROCEDURE — 160002 HCHG RECOVERY MINUTES (STAT): Performed by: NEUROLOGICAL SURGERY

## 2021-10-13 PROCEDURE — 501411 HCHG SPONGE, BABY LAP W/O RINGS: Performed by: NEUROLOGICAL SURGERY

## 2021-10-13 PROCEDURE — 501838 HCHG SUTURE GENERAL: Performed by: NEUROLOGICAL SURGERY

## 2021-10-13 PROCEDURE — 160009 HCHG ANES TIME/MIN: Performed by: NEUROLOGICAL SURGERY

## 2021-10-13 PROCEDURE — 700102 HCHG RX REV CODE 250 W/ 637 OVERRIDE(OP): Performed by: NEUROLOGICAL SURGERY

## 2021-10-13 PROCEDURE — 160029 HCHG SURGERY MINUTES - 1ST 30 MINS LEVEL 4: Performed by: NEUROLOGICAL SURGERY

## 2021-10-13 PROCEDURE — 82962 GLUCOSE BLOOD TEST: CPT | Mod: 91

## 2021-10-13 PROCEDURE — G0378 HOSPITAL OBSERVATION PER HR: HCPCS

## 2021-10-13 PROCEDURE — 700105 HCHG RX REV CODE 258: Performed by: PHYSICIAN ASSISTANT

## 2021-10-13 PROCEDURE — A9270 NON-COVERED ITEM OR SERVICE: HCPCS | Performed by: PHYSICIAN ASSISTANT

## 2021-10-13 PROCEDURE — 700111 HCHG RX REV CODE 636 W/ 250 OVERRIDE (IP): Performed by: PHYSICIAN ASSISTANT

## 2021-10-13 PROCEDURE — 110454 HCHG SHELL REV 250: Performed by: NEUROLOGICAL SURGERY

## 2021-10-13 PROCEDURE — 500885 HCHG PACK, JACKSON TABLE: Performed by: NEUROLOGICAL SURGERY

## 2021-10-13 PROCEDURE — 700101 HCHG RX REV CODE 250: Performed by: ANESTHESIOLOGY

## 2021-10-13 PROCEDURE — 500367 HCHG DRAIN KIT, HEMOVAC: Performed by: NEUROLOGICAL SURGERY

## 2021-10-13 PROCEDURE — 700102 HCHG RX REV CODE 250 W/ 637 OVERRIDE(OP): Performed by: PHYSICIAN ASSISTANT

## 2021-10-13 PROCEDURE — 700111 HCHG RX REV CODE 636 W/ 250 OVERRIDE (IP): Performed by: NEUROLOGICAL SURGERY

## 2021-10-13 PROCEDURE — 96365 THER/PROPH/DIAG IV INF INIT: CPT

## 2021-10-13 PROCEDURE — 96372 THER/PROPH/DIAG INJ SC/IM: CPT

## 2021-10-13 PROCEDURE — 160048 HCHG OR STATISTICAL LEVEL 1-5: Performed by: NEUROLOGICAL SURGERY

## 2021-10-13 PROCEDURE — A9270 NON-COVERED ITEM OR SERVICE: HCPCS | Performed by: NEUROLOGICAL SURGERY

## 2021-10-13 PROCEDURE — 160041 HCHG SURGERY MINUTES - EA ADDL 1 MIN LEVEL 4: Performed by: NEUROLOGICAL SURGERY

## 2021-10-13 RX ORDER — HYDROMORPHONE HYDROCHLORIDE 1 MG/ML
0.1 INJECTION, SOLUTION INTRAMUSCULAR; INTRAVENOUS; SUBCUTANEOUS
Status: DISCONTINUED | OUTPATIENT
Start: 2021-10-13 | End: 2021-10-13 | Stop reason: HOSPADM

## 2021-10-13 RX ORDER — OXYCODONE HYDROCHLORIDE 5 MG/1
10 TABLET ORAL
Status: DISCONTINUED | OUTPATIENT
Start: 2021-10-13 | End: 2021-10-14 | Stop reason: HOSPADM

## 2021-10-13 RX ORDER — LISINOPRIL 20 MG/1
40 TABLET ORAL DAILY
Status: DISCONTINUED | OUTPATIENT
Start: 2021-10-13 | End: 2021-10-14 | Stop reason: HOSPADM

## 2021-10-13 RX ORDER — DEXTROSE MONOHYDRATE 25 G/50ML
50 INJECTION, SOLUTION INTRAVENOUS
Status: DISCONTINUED | OUTPATIENT
Start: 2021-10-13 | End: 2021-10-13

## 2021-10-13 RX ORDER — BUPIVACAINE HYDROCHLORIDE AND EPINEPHRINE 5; 5 MG/ML; UG/ML
INJECTION, SOLUTION EPIDURAL; INTRACAUDAL; PERINEURAL
Status: DISCONTINUED | OUTPATIENT
Start: 2021-10-13 | End: 2021-10-13 | Stop reason: HOSPADM

## 2021-10-13 RX ORDER — ONDANSETRON 4 MG/1
4 TABLET, ORALLY DISINTEGRATING ORAL EVERY 4 HOURS PRN
Status: DISCONTINUED | OUTPATIENT
Start: 2021-10-13 | End: 2021-10-14 | Stop reason: HOSPADM

## 2021-10-13 RX ORDER — AMLODIPINE BESYLATE 2.5 MG/1
2.5 TABLET ORAL EVERY EVENING
Status: DISCONTINUED | OUTPATIENT
Start: 2021-10-13 | End: 2021-10-14 | Stop reason: HOSPADM

## 2021-10-13 RX ORDER — POLYVINYL ALCOHOL 14 MG/ML
1 SOLUTION/ DROPS OPHTHALMIC PRN
Status: DISCONTINUED | OUTPATIENT
Start: 2021-10-13 | End: 2021-10-14 | Stop reason: HOSPADM

## 2021-10-13 RX ORDER — OXYCODONE HCL 5 MG/5 ML
10 SOLUTION, ORAL ORAL
Status: DISCONTINUED | OUTPATIENT
Start: 2021-10-13 | End: 2021-10-13 | Stop reason: HOSPADM

## 2021-10-13 RX ORDER — AMOXICILLIN 250 MG
1 CAPSULE ORAL
Status: DISCONTINUED | OUTPATIENT
Start: 2021-10-13 | End: 2021-10-14 | Stop reason: HOSPADM

## 2021-10-13 RX ORDER — PANTOPRAZOLE SODIUM 20 MG/1
20 TABLET, DELAYED RELEASE ORAL EVERY EVENING
Status: DISCONTINUED | OUTPATIENT
Start: 2021-10-13 | End: 2021-10-13

## 2021-10-13 RX ORDER — CARVEDILOL 25 MG/1
25 TABLET ORAL 2 TIMES DAILY WITH MEALS
Status: DISCONTINUED | OUTPATIENT
Start: 2021-10-13 | End: 2021-10-14 | Stop reason: HOSPADM

## 2021-10-13 RX ORDER — DOCUSATE SODIUM 100 MG/1
100 CAPSULE, LIQUID FILLED ORAL 2 TIMES DAILY
Status: DISCONTINUED | OUTPATIENT
Start: 2021-10-13 | End: 2021-10-14 | Stop reason: HOSPADM

## 2021-10-13 RX ORDER — ENEMA 19; 7 G/133ML; G/133ML
1 ENEMA RECTAL
Status: DISCONTINUED | OUTPATIENT
Start: 2021-10-13 | End: 2021-10-14 | Stop reason: HOSPADM

## 2021-10-13 RX ORDER — HYDROMORPHONE HYDROCHLORIDE 1 MG/ML
0.4 INJECTION, SOLUTION INTRAMUSCULAR; INTRAVENOUS; SUBCUTANEOUS
Status: DISCONTINUED | OUTPATIENT
Start: 2021-10-13 | End: 2021-10-13 | Stop reason: HOSPADM

## 2021-10-13 RX ORDER — HALOPERIDOL 5 MG/ML
1 INJECTION INTRAMUSCULAR
Status: DISCONTINUED | OUTPATIENT
Start: 2021-10-13 | End: 2021-10-13 | Stop reason: HOSPADM

## 2021-10-13 RX ORDER — ONDANSETRON 2 MG/ML
INJECTION INTRAMUSCULAR; INTRAVENOUS PRN
Status: DISCONTINUED | OUTPATIENT
Start: 2021-10-13 | End: 2021-10-13 | Stop reason: SURG

## 2021-10-13 RX ORDER — ZOLPIDEM TARTRATE 5 MG/1
5 TABLET ORAL NIGHTLY PRN
Status: DISCONTINUED | OUTPATIENT
Start: 2021-10-13 | End: 2021-10-14 | Stop reason: HOSPADM

## 2021-10-13 RX ORDER — HYDRALAZINE HYDROCHLORIDE 20 MG/ML
5 INJECTION INTRAMUSCULAR; INTRAVENOUS
Status: DISCONTINUED | OUTPATIENT
Start: 2021-10-13 | End: 2021-10-13 | Stop reason: HOSPADM

## 2021-10-13 RX ORDER — INSULIN LISPRO 100 [IU]/ML
1-6 INJECTION, SOLUTION INTRAVENOUS; SUBCUTANEOUS
Status: DISCONTINUED | OUTPATIENT
Start: 2021-10-13 | End: 2021-10-13

## 2021-10-13 RX ORDER — VANCOMYCIN HYDROCHLORIDE 500 MG/10ML
INJECTION, POWDER, LYOPHILIZED, FOR SOLUTION INTRAVENOUS
Status: COMPLETED | OUTPATIENT
Start: 2021-10-13 | End: 2021-10-13

## 2021-10-13 RX ORDER — ONDANSETRON 2 MG/ML
4 INJECTION INTRAMUSCULAR; INTRAVENOUS EVERY 4 HOURS PRN
Status: DISCONTINUED | OUTPATIENT
Start: 2021-10-13 | End: 2021-10-14 | Stop reason: HOSPADM

## 2021-10-13 RX ORDER — LIDOCAINE HYDROCHLORIDE 20 MG/ML
INJECTION, SOLUTION EPIDURAL; INFILTRATION; INTRACAUDAL; PERINEURAL PRN
Status: DISCONTINUED | OUTPATIENT
Start: 2021-10-13 | End: 2021-10-13 | Stop reason: SURG

## 2021-10-13 RX ORDER — INSULIN GLARGINE 100 [IU]/ML
80 INJECTION, SOLUTION SUBCUTANEOUS 2 TIMES DAILY
Status: DISCONTINUED | OUTPATIENT
Start: 2021-10-13 | End: 2021-10-14 | Stop reason: HOSPADM

## 2021-10-13 RX ORDER — HYDROMORPHONE HYDROCHLORIDE 1 MG/ML
0.5 INJECTION, SOLUTION INTRAMUSCULAR; INTRAVENOUS; SUBCUTANEOUS
Status: DISCONTINUED | OUTPATIENT
Start: 2021-10-13 | End: 2021-10-14 | Stop reason: HOSPADM

## 2021-10-13 RX ORDER — LIDOCAINE HYDROCHLORIDE 40 MG/ML
SOLUTION TOPICAL PRN
Status: DISCONTINUED | OUTPATIENT
Start: 2021-10-13 | End: 2021-10-13 | Stop reason: SURG

## 2021-10-13 RX ORDER — GABAPENTIN 300 MG/1
900 CAPSULE ORAL
Status: DISCONTINUED | OUTPATIENT
Start: 2021-10-13 | End: 2021-10-14 | Stop reason: HOSPADM

## 2021-10-13 RX ORDER — CEFAZOLIN SODIUM 1 G/3ML
INJECTION, POWDER, FOR SOLUTION INTRAMUSCULAR; INTRAVENOUS PRN
Status: DISCONTINUED | OUTPATIENT
Start: 2021-10-13 | End: 2021-10-13 | Stop reason: SURG

## 2021-10-13 RX ORDER — BISACODYL 10 MG
10 SUPPOSITORY, RECTAL RECTAL
Status: DISCONTINUED | OUTPATIENT
Start: 2021-10-13 | End: 2021-10-14 | Stop reason: HOSPADM

## 2021-10-13 RX ORDER — ATORVASTATIN CALCIUM 80 MG/1
80 TABLET, FILM COATED ORAL NIGHTLY
Status: DISCONTINUED | OUTPATIENT
Start: 2021-10-13 | End: 2021-10-14 | Stop reason: HOSPADM

## 2021-10-13 RX ORDER — CALCIUM CARBONATE 500 MG/1
500 TABLET, CHEWABLE ORAL 2 TIMES DAILY
Status: DISCONTINUED | OUTPATIENT
Start: 2021-10-13 | End: 2021-10-14 | Stop reason: HOSPADM

## 2021-10-13 RX ORDER — MEPERIDINE HYDROCHLORIDE 25 MG/ML
12.5 INJECTION INTRAMUSCULAR; INTRAVENOUS; SUBCUTANEOUS
Status: DISCONTINUED | OUTPATIENT
Start: 2021-10-13 | End: 2021-10-13 | Stop reason: HOSPADM

## 2021-10-13 RX ORDER — PHENYLEPHRINE HYDROCHLORIDE 10 MG/ML
INJECTION, SOLUTION INTRAMUSCULAR; INTRAVENOUS; SUBCUTANEOUS PRN
Status: DISCONTINUED | OUTPATIENT
Start: 2021-10-13 | End: 2021-10-13 | Stop reason: SURG

## 2021-10-13 RX ORDER — ONDANSETRON 2 MG/ML
4 INJECTION INTRAMUSCULAR; INTRAVENOUS
Status: DISCONTINUED | OUTPATIENT
Start: 2021-10-13 | End: 2021-10-13 | Stop reason: HOSPADM

## 2021-10-13 RX ORDER — POLYETHYLENE GLYCOL 3350 17 G/17G
1 POWDER, FOR SOLUTION ORAL 2 TIMES DAILY PRN
Status: DISCONTINUED | OUTPATIENT
Start: 2021-10-13 | End: 2021-10-14 | Stop reason: HOSPADM

## 2021-10-13 RX ORDER — HYDRALAZINE HYDROCHLORIDE 20 MG/ML
10 INJECTION INTRAMUSCULAR; INTRAVENOUS
Status: DISCONTINUED | OUTPATIENT
Start: 2021-10-13 | End: 2021-10-14 | Stop reason: HOSPADM

## 2021-10-13 RX ORDER — OMEPRAZOLE 20 MG/1
20 CAPSULE, DELAYED RELEASE ORAL EVERY EVENING
Status: DISCONTINUED | OUTPATIENT
Start: 2021-10-13 | End: 2021-10-14 | Stop reason: HOSPADM

## 2021-10-13 RX ORDER — CYCLOBENZAPRINE HCL 10 MG
10 TABLET ORAL EVERY 8 HOURS PRN
Status: DISCONTINUED | OUTPATIENT
Start: 2021-10-13 | End: 2021-10-14 | Stop reason: HOSPADM

## 2021-10-13 RX ORDER — SODIUM CHLORIDE, SODIUM LACTATE, POTASSIUM CHLORIDE, CALCIUM CHLORIDE 600; 310; 30; 20 MG/100ML; MG/100ML; MG/100ML; MG/100ML
INJECTION, SOLUTION INTRAVENOUS CONTINUOUS
Status: DISCONTINUED | OUTPATIENT
Start: 2021-10-13 | End: 2021-10-13 | Stop reason: HOSPADM

## 2021-10-13 RX ORDER — DIGOXIN 125 MCG
125 TABLET ORAL
Status: DISCONTINUED | OUTPATIENT
Start: 2021-10-13 | End: 2021-10-14 | Stop reason: HOSPADM

## 2021-10-13 RX ORDER — AMOXICILLIN 250 MG
1 CAPSULE ORAL NIGHTLY
Status: DISCONTINUED | OUTPATIENT
Start: 2021-10-13 | End: 2021-10-14 | Stop reason: HOSPADM

## 2021-10-13 RX ORDER — HYDROMORPHONE HYDROCHLORIDE 1 MG/ML
0.2 INJECTION, SOLUTION INTRAMUSCULAR; INTRAVENOUS; SUBCUTANEOUS
Status: DISCONTINUED | OUTPATIENT
Start: 2021-10-13 | End: 2021-10-13 | Stop reason: HOSPADM

## 2021-10-13 RX ORDER — ROCURONIUM BROMIDE 10 MG/ML
INJECTION, SOLUTION INTRAVENOUS PRN
Status: DISCONTINUED | OUTPATIENT
Start: 2021-10-13 | End: 2021-10-13 | Stop reason: SURG

## 2021-10-13 RX ORDER — INSULIN LISPRO 100 [IU]/ML
20 INJECTION, SOLUTION INTRAVENOUS; SUBCUTANEOUS
Status: DISCONTINUED | OUTPATIENT
Start: 2021-10-13 | End: 2021-10-13

## 2021-10-13 RX ORDER — CEFAZOLIN SODIUM 2 G/100ML
2 INJECTION, SOLUTION INTRAVENOUS EVERY 8 HOURS
Status: COMPLETED | OUTPATIENT
Start: 2021-10-13 | End: 2021-10-14

## 2021-10-13 RX ORDER — DIPHENHYDRAMINE HYDROCHLORIDE 50 MG/ML
6.25 INJECTION INTRAMUSCULAR; INTRAVENOUS
Status: DISCONTINUED | OUTPATIENT
Start: 2021-10-13 | End: 2021-10-13 | Stop reason: HOSPADM

## 2021-10-13 RX ORDER — DIPHENHYDRAMINE HYDROCHLORIDE 50 MG/ML
25 INJECTION INTRAMUSCULAR; INTRAVENOUS EVERY 6 HOURS PRN
Status: DISCONTINUED | OUTPATIENT
Start: 2021-10-13 | End: 2021-10-14 | Stop reason: HOSPADM

## 2021-10-13 RX ORDER — SODIUM CHLORIDE 9 MG/ML
INJECTION, SOLUTION INTRAVENOUS CONTINUOUS
Status: DISCONTINUED | OUTPATIENT
Start: 2021-10-13 | End: 2021-10-14 | Stop reason: HOSPADM

## 2021-10-13 RX ORDER — DIPHENHYDRAMINE HCL 25 MG
25 TABLET ORAL EVERY 6 HOURS PRN
Status: DISCONTINUED | OUTPATIENT
Start: 2021-10-13 | End: 2021-10-14 | Stop reason: HOSPADM

## 2021-10-13 RX ORDER — SODIUM CHLORIDE, SODIUM LACTATE, POTASSIUM CHLORIDE, CALCIUM CHLORIDE 600; 310; 30; 20 MG/100ML; MG/100ML; MG/100ML; MG/100ML
INJECTION, SOLUTION INTRAVENOUS CONTINUOUS
Status: ACTIVE | OUTPATIENT
Start: 2021-10-13 | End: 2021-10-13

## 2021-10-13 RX ORDER — VANCOMYCIN HYDROCHLORIDE 1 G/20ML
INJECTION, POWDER, LYOPHILIZED, FOR SOLUTION INTRAVENOUS
Status: COMPLETED | OUTPATIENT
Start: 2021-10-13 | End: 2021-10-13

## 2021-10-13 RX ORDER — DULOXETIN HYDROCHLORIDE 60 MG/1
60 CAPSULE, DELAYED RELEASE ORAL DAILY
Status: DISCONTINUED | OUTPATIENT
Start: 2021-10-13 | End: 2021-10-14 | Stop reason: HOSPADM

## 2021-10-13 RX ORDER — OXYCODONE HCL 5 MG/5 ML
5 SOLUTION, ORAL ORAL
Status: DISCONTINUED | OUTPATIENT
Start: 2021-10-13 | End: 2021-10-13 | Stop reason: HOSPADM

## 2021-10-13 RX ORDER — OXYCODONE HYDROCHLORIDE 5 MG/1
5 TABLET ORAL
Status: DISCONTINUED | OUTPATIENT
Start: 2021-10-13 | End: 2021-10-14 | Stop reason: HOSPADM

## 2021-10-13 RX ADMIN — CEFAZOLIN SODIUM 2 G: 2 INJECTION, SOLUTION INTRAVENOUS at 23:46

## 2021-10-13 RX ADMIN — SODIUM CHLORIDE, POTASSIUM CHLORIDE, SODIUM LACTATE AND CALCIUM CHLORIDE: 600; 310; 30; 20 INJECTION, SOLUTION INTRAVENOUS at 08:19

## 2021-10-13 RX ADMIN — ATORVASTATIN CALCIUM 80 MG: 80 TABLET, FILM COATED ORAL at 20:28

## 2021-10-13 RX ADMIN — PHENYLEPHRINE HYDROCHLORIDE 100 MCG: 10 INJECTION INTRAVENOUS at 07:32

## 2021-10-13 RX ADMIN — PHENYLEPHRINE HYDROCHLORIDE 100 MCG: 10 INJECTION INTRAVENOUS at 07:26

## 2021-10-13 RX ADMIN — PHENYLEPHRINE HYDROCHLORIDE 100 MCG: 10 INJECTION INTRAVENOUS at 07:39

## 2021-10-13 RX ADMIN — PHENYLEPHRINE HYDROCHLORIDE 100 MCG: 10 INJECTION INTRAVENOUS at 08:19

## 2021-10-13 RX ADMIN — INSULIN GLARGINE 80 UNITS: 100 INJECTION, SOLUTION SUBCUTANEOUS at 20:37

## 2021-10-13 RX ADMIN — ONDANSETRON 4 MG: 2 INJECTION INTRAMUSCULAR; INTRAVENOUS at 08:20

## 2021-10-13 RX ADMIN — Medication 400 MG: at 17:58

## 2021-10-13 RX ADMIN — SODIUM CHLORIDE: 9 INJECTION, SOLUTION INTRAVENOUS at 23:49

## 2021-10-13 RX ADMIN — DOCUSATE SODIUM 100 MG: 100 CAPSULE ORAL at 17:59

## 2021-10-13 RX ADMIN — CEFAZOLIN SODIUM 2 G: 2 INJECTION, SOLUTION INTRAVENOUS at 16:14

## 2021-10-13 RX ADMIN — CEFAZOLIN 3 G: 330 INJECTION, POWDER, FOR SOLUTION INTRAMUSCULAR; INTRAVENOUS at 07:07

## 2021-10-13 RX ADMIN — PROPOFOL 200 MG: 10 INJECTION, EMULSION INTRAVENOUS at 07:07

## 2021-10-13 RX ADMIN — DULOXETINE HYDROCHLORIDE 60 MG: 60 CAPSULE, DELAYED RELEASE ORAL at 16:13

## 2021-10-13 RX ADMIN — SODIUM CHLORIDE: 9 INJECTION, SOLUTION INTRAVENOUS at 16:17

## 2021-10-13 RX ADMIN — LISINOPRIL 40 MG: 20 TABLET ORAL at 16:15

## 2021-10-13 RX ADMIN — INSULIN HUMAN 2 UNITS: 100 INJECTION, SOLUTION PARENTERAL at 20:36

## 2021-10-13 RX ADMIN — AMLODIPINE BESYLATE 2.5 MG: 2.5 TABLET ORAL at 20:27

## 2021-10-13 RX ADMIN — PHENYLEPHRINE HYDROCHLORIDE 100 MCG: 10 INJECTION INTRAVENOUS at 07:19

## 2021-10-13 RX ADMIN — ROCURONIUM BROMIDE 70 MG: 10 INJECTION, SOLUTION INTRAVENOUS at 07:07

## 2021-10-13 RX ADMIN — EPHEDRINE SULFATE 10 MG: 50 INJECTION, SOLUTION INTRAVENOUS at 07:16

## 2021-10-13 RX ADMIN — GABAPENTIN 900 MG: 300 CAPSULE ORAL at 20:30

## 2021-10-13 RX ADMIN — OMEPRAZOLE 20 MG: 20 CAPSULE, DELAYED RELEASE ORAL at 17:59

## 2021-10-13 RX ADMIN — FENTANYL CITRATE 100 MCG: 50 INJECTION, SOLUTION INTRAMUSCULAR; INTRAVENOUS at 07:07

## 2021-10-13 RX ADMIN — PHENYLEPHRINE HYDROCHLORIDE 100 MCG: 10 INJECTION INTRAVENOUS at 07:24

## 2021-10-13 RX ADMIN — DIGOXIN 125 MCG: 125 TABLET ORAL at 16:15

## 2021-10-13 RX ADMIN — LIDOCAINE HYDROCHLORIDE 4 ML: 40 SOLUTION TOPICAL at 07:07

## 2021-10-13 RX ADMIN — FENTANYL CITRATE 50 MCG: 50 INJECTION, SOLUTION INTRAMUSCULAR; INTRAVENOUS at 08:24

## 2021-10-13 RX ADMIN — CARVEDILOL 25 MG: 25 TABLET, FILM COATED ORAL at 20:28

## 2021-10-13 RX ADMIN — EPHEDRINE SULFATE 10 MG: 50 INJECTION, SOLUTION INTRAVENOUS at 07:18

## 2021-10-13 RX ADMIN — SODIUM CHLORIDE, POTASSIUM CHLORIDE, SODIUM LACTATE AND CALCIUM CHLORIDE: 600; 310; 30; 20 INJECTION, SOLUTION INTRAVENOUS at 06:17

## 2021-10-13 RX ADMIN — CALCIUM CARBONATE 500 MG: 500 TABLET, CHEWABLE ORAL at 17:59

## 2021-10-13 RX ADMIN — SENNOSIDES AND DOCUSATE SODIUM 1 TABLET: 50; 8.6 TABLET ORAL at 21:00

## 2021-10-13 RX ADMIN — PHENYLEPHRINE HYDROCHLORIDE 100 MCG: 10 INJECTION INTRAVENOUS at 08:08

## 2021-10-13 RX ADMIN — METFORMIN HYDROCHLORIDE 1000 MG: 500 TABLET ORAL at 17:58

## 2021-10-13 RX ADMIN — SUGAMMADEX 200 MG: 100 INJECTION, SOLUTION INTRAVENOUS at 08:26

## 2021-10-13 RX ADMIN — LIDOCAINE HYDROCHLORIDE 60 MG: 20 INJECTION, SOLUTION EPIDURAL; INFILTRATION; INTRACAUDAL at 07:07

## 2021-10-13 ASSESSMENT — PAIN DESCRIPTION - PAIN TYPE
TYPE: ACUTE PAIN
TYPE: SURGICAL PAIN
TYPE: ACUTE PAIN
TYPE: SURGICAL PAIN
TYPE: ACUTE PAIN
TYPE: SURGICAL PAIN
TYPE: SURGICAL PAIN
TYPE: ACUTE PAIN
TYPE: ACUTE PAIN
TYPE: SURGICAL PAIN
TYPE: ACUTE PAIN;REFERRED PAIN
TYPE: SURGICAL PAIN
TYPE: ACUTE PAIN

## 2021-10-13 NOTE — ANESTHESIA TIME REPORT
Anesthesia Start and Stop Event Times     Date Time Event    10/13/2021 0642 Ready for Procedure     0703 Anesthesia Start     0849 Anesthesia Stop        Responsible Staff  10/13/21    Name Role Begin End    Royal Chappell M.D. Anesth 0703 0849        Preop Diagnosis (Free Text):  Pre-op Diagnosis     LUMBAR SPONDYLOSIS, RADICULOPATHY AND LOW BACK PAIN        Preop Diagnosis (Codes):  Diagnosis Information     Diagnosis Code(s): Lumbar stenosis [M48.061]     Lumbar radiculopathy [M54.16]     Low back pain [M54.5]        Premium Reason  Non-Premium    Comments:

## 2021-10-13 NOTE — ANESTHESIA PREPROCEDURE EVALUATION
Relevant Problems   CARDIAC   (positive) CAD (coronary artery disease)- hx LAD stent    (positive) HTN (hypertension)      Other   (positive) DM (diabetes mellitus) (HCC)   (positive) Obesity       Physical Exam    Airway   Mallampati: II  TM distance: >3 FB  Neck ROM: full       Cardiovascular - normal exam  Rhythm: regular  Rate: normal  (-) murmur     Dental - normal exam      Very poor dentition   Pulmonary - normal exam  Breath sounds clear to auscultation     Abdominal   (+) obese     Neurological - normal exam                 Anesthesia Plan    ASA 3   ASA physical status 3 criteria: diabetes - poorly controlled, morbid obesity - BMI greater than or equal to 40 and CAD/stents (> 3 months)    Plan - general       Airway plan will be ETT          Induction: intravenous    Postoperative Plan: Postoperative administration of opioids is intended.    Pertinent diagnostic labs and testing reviewed    Informed Consent:    Anesthetic plan and risks discussed with patient.

## 2021-10-13 NOTE — OR NURSING
Arrived to PACU in stable condition. Denies pain or nausea. ALFORD and is doing great. Taking po well and family updated via text. Belongings at bedside.     Patient has done well in recovery. Hemovack had come disconnected and large amount of blood on the sheet. Reconnected and now working well. Denies pain and is ready to transport to his room. Report called to RN

## 2021-10-13 NOTE — ANESTHESIA POSTPROCEDURE EVALUATION
Patient: Toney Watson Jr.    Procedure Summary     Date: 10/13/21 Room / Location: [facility] 06 / SURGERY [facility]    Anesthesia Start: 0703 Anesthesia Stop: 0849    Procedure: LAMINOTOMY - RE-DO L4-S1 (Right Back) Diagnosis:       Lumbar stenosis      Lumbar radiculopathy      Low back pain      (lumbar stenosis radiculopathy and low back pain )    Surgeons: Alberto Aguirre M.D. Responsible Provider: Royal Chapepll M.D.    Anesthesia Type: general ASA Status: 3          Final Anesthesia Type: general  Last vitals  BP   Blood Pressure : 114/58    Temp   36 °C (96.8 °F)    Pulse   (!) 52   Resp   15    SpO2   96 %      Anesthesia Post Evaluation    Patient location during evaluation: PACU  Patient participation: complete - patient participated  Level of consciousness: awake and alert    Airway patency: patent  Anesthetic complications: no  Cardiovascular status: hemodynamically stable  Respiratory status: acceptable  Hydration status: euvolemic    PONV: none          No complications documented.     Nurse Pain Score: 0 (NPRS)

## 2021-10-13 NOTE — ANESTHESIA PROCEDURE NOTES
Airway    Date/Time: 10/13/2021 7:07 AM  Performed by: Royal Chappell M.D.  Authorized by: Royal Chappell M.D.     Location:  OR  Urgency:  Elective  Difficult Airway: No    Indications for Airway Management:  Anesthesia      Spontaneous Ventilation: absent    Sedation Level:  Deep  Preoxygenated: Yes    Patient Position:  Sniffing  Mask Difficulty Assessment:  1 - vent by mask  Final Airway Type:  Endotracheal airway  Final Endotracheal Airway:  ETT  Cuffed: Yes    Technique Used for Successful ETT Placement:  Direct laryngoscopy  Devices/Methods Used in Placement:  Intubating stylet    Insertion Site:  Oral  Blade Type:  Pillo  Laryngoscope Blade/Videolaryngoscope Blade Size:  3  ETT Size (mm):  7.5  Measured from:  Teeth  ETT to Teeth (cm):  23  Placement Verified by: auscultation and capnometry    Cormack-Lehane Classification:  Grade I - full view of glottis  Number of Attempts at Approach:  1

## 2021-10-13 NOTE — OP REPORT
Immediate Post OP Note    PreOp Diagnosis: Lumbar stenosis with radiculopathy, S/p lumbar fusion      PostOp Diagnosis: Lumbar stenosis with radiculopathy, S/p lumbar fusion      Procedure(s):  LAMINOTOMY - RE-DO L4-S1 - Wound Class: Clean with Drain    Surgeon(s):  Alberto Aguirre M.D.    Anesthesiologist/Type of Anesthesia:  Anesthesiologist: Royal Chappell M.D./General    Surgical Staff:  Circulator: Nadia Reyes R.N.  Relief Scrub: Esteban Shin  Scrub Person: Caitlin Bourne  First Assist: Magdalena Tom P.A.-C.  Radiology Technologist: Dominic Eason    Specimens removed if any:  * No specimens in log *    Estimated Blood Loss: 25mls    Findings: stenosis, scar      Complications: None        10/13/2021 9:03 AM Magdalena Tom P.A.-C.

## 2021-10-13 NOTE — OP REPORT
DATE OF SERVICE:  10/13/2021     SURGEON:  Alberto Aguirre MD     FIRST ASSISTANT:  Magdalena Tom PA-C     ANESTHESIOLOGIST:  Royal Chappell MD     ANESTHESIA:  GETA.     PREOPERATIVE DIAGNOSES:  1.  Recurrent lumbar radiculopathy, right L4, right L5, right S1.  2.  Connective tissue foraminal stenosis with radiculopathy, L4-S1.  3.  Low back pain, right greater than left lumbar radiculopathy.     POSTOPERATIVE DIAGNOSES:  1.  Recurrent lumbar radiculopathy, right L4, right L5, right S1.  2.  Connective tissue foraminal stenosis with radiculopathy, L4-S1.  3.  Low back pain, right greater than left lumbar radiculopathy.     PROCEDURES PERFORMED:  1.  Right L4 transpedicular approach for decompression of the right L4 nerve.  2.  Right L5 transpedicular approach for decompression of the right L5 nerve.  3.  Right S1 transpedicular approach for decompression of the right S1 nerve.  4.  Posterolateral arthrodesis/fusion, L4-5.  5.  Fertile local autograft, same incision for the purpose of posterolateral   arthrodesis and fusion, L4-5.     INDICATIONS FOR PROCEDURE:  This is a 71-year-old male who had previously   undergone 2 lumbar surgeries and then underwent an L4-5 transforaminal lumbar   interbody fusion with me about a year ago.  He had done extremely well from   that surgery and started suffering from recurrent right lumbar radiculopathy   several months ago.  His lumbar MRI did not show any specific new pathology.    We hypothesized that he likely had a significant amount of scar tissue.  He   had previously been through injections and epidural steroid injections as well   as physical therapy without any benefit and was not interested in undergoing   these treatments for which have previously failed for him.  We discussed redo   laminotomy, foraminotomy, decompression.  The in situ fusion was added on   during intraop surgery due to further disruption of the posterolateral fusion   that had previously been  performed in order to further decompress the nerve   root, so we did lie down further bone for posterolateral arthrodesis/fusion to   help shore up that L4-5 posterolateral fusion that had been in process from   his last surgery with me.  He understood risks, benefits and alternatives of   procedure and wished to proceed.     PROCEDURE IN DETAIL:  The patient was brought to the operating room, intubated   by the anesthesiologist.  He was placed prone on Ramin OSI table.  He was   marked, prepped and draped in the usual sterile fashion.  Preprocedure timeout   was performed.  A 0.5% Marcaine with epinephrine was infiltrated in the skin.    A 10-blade was used to sharply incise skin and subcutaneous tissue.    Monopolar electrocautery was used to dissect down through the scar tissue from   the midline over the medial and lateral facet complexes from L4 down to S1.    There was quite tenacious and very firm scar tissue.  I was able to mobilize   through this, the L4-5 interspace, the pars intra-articularis on the right had   been essentially completely removed.  The lamina at L4 and L5 on the right   had been completely removed.  Complete medial facetectomy had previously been   completed at L5.  In order to further decompress him at this point, an angled   curette was used to detach the very tenacious scar tissue from the axilla and   shoulder of the nerve roots, right L4, right L5 and right S1.  High-speed   Midas Jhoan drill was used to drill transpedicular approach down the medial   border of the right L4, right L5 and right S1 pedicles to further decompress   the nerve roots.  Bone chips were saved.  Further generous foraminotomies were   created with a 4-mm Kerrison over the S1 nerve root.  A Perkins was utilized   to confirm that the nerve roots were free of any and all compression at this   point.  I was very pleased with the decompression, particularly given the   amount and density of the scar tissue.  The  wound was irrigated.  I did feel   that part of the posterolateral fusion at L4-5 from the right previously had   been broached with the decompression and so the bone that had been harvested   from the transpedicular approaches at this point was morselized and placed in   posterolateral gutters for posterolateral arthrodesis/fusion L4-5.  The wound   was further irrigated.  Bipolar electrocautery was used for hemostasis.  A 1   gram vancomycin powder was infiltrated deep into the wound.  Medium Hemovac   was placed in the epidural space, tunneled through a separate stab incision,   affixed to the skin.  The wound was meticulously closed in layers.    Steri-Strips were applied to skin edges.  Sterile dressing was applied.  The   patient was extubated in the operating room, taken to PACU in stable   condition.     ESTIMATED BLOOD LOSS:  25 mL.     COMPLICATIONS:  None noted.        ______________________________  Alberto Aguirre MD SA/ELVIRA/WILNER    DD:  10/13/2021 08:52  DT:  10/13/2021 10:02    Job#:  [id number]

## 2021-10-14 VITALS
RESPIRATION RATE: 18 BRPM | WEIGHT: 301.15 LBS | HEIGHT: 70 IN | HEART RATE: 62 BPM | DIASTOLIC BLOOD PRESSURE: 61 MMHG | BODY MASS INDEX: 43.11 KG/M2 | OXYGEN SATURATION: 95 % | TEMPERATURE: 97.7 F | SYSTOLIC BLOOD PRESSURE: 107 MMHG

## 2021-10-14 LAB
GLUCOSE BLD-MCNC: 163 MG/DL (ref 65–99)
GLUCOSE BLD-MCNC: 173 MG/DL (ref 65–99)
GLUCOSE BLD-MCNC: 212 MG/DL (ref 65–99)

## 2021-10-14 PROCEDURE — 97165 OT EVAL LOW COMPLEX 30 MIN: CPT

## 2021-10-14 PROCEDURE — 700102 HCHG RX REV CODE 250 W/ 637 OVERRIDE(OP): Performed by: PHYSICIAN ASSISTANT

## 2021-10-14 PROCEDURE — 700111 HCHG RX REV CODE 636 W/ 250 OVERRIDE (IP): Performed by: PHYSICIAN ASSISTANT

## 2021-10-14 PROCEDURE — 82962 GLUCOSE BLOOD TEST: CPT | Mod: 91

## 2021-10-14 PROCEDURE — A9720 HCHG RX REV CODE 250 W/ 637 OVERRIDE(OP): HCPCS | Performed by: PHYSICIAN ASSISTANT

## 2021-10-14 PROCEDURE — 97161 PT EVAL LOW COMPLEX 20 MIN: CPT

## 2021-10-14 PROCEDURE — 96372 THER/PROPH/DIAG INJ SC/IM: CPT

## 2021-10-14 PROCEDURE — A9270 NON-COVERED ITEM OR SERVICE: HCPCS | Performed by: PHYSICIAN ASSISTANT

## 2021-10-14 PROCEDURE — G0378 HOSPITAL OBSERVATION PER HR: HCPCS

## 2021-10-14 RX ADMIN — ENOXAPARIN SODIUM 40 MG: 40 INJECTION SUBCUTANEOUS at 08:19

## 2021-10-14 RX ADMIN — METFORMIN HYDROCHLORIDE 1000 MG: 500 TABLET ORAL at 08:18

## 2021-10-14 RX ADMIN — INSULIN HUMAN 1 UNITS: 100 INJECTION, SOLUTION PARENTERAL at 11:25

## 2021-10-14 RX ADMIN — CARVEDILOL 25 MG: 25 TABLET, FILM COATED ORAL at 08:18

## 2021-10-14 RX ADMIN — DOCUSATE SODIUM 100 MG: 100 CAPSULE ORAL at 04:47

## 2021-10-14 RX ADMIN — GABAPENTIN 900 MG: 300 CAPSULE ORAL at 08:19

## 2021-10-14 RX ADMIN — LISINOPRIL 40 MG: 20 TABLET ORAL at 04:48

## 2021-10-14 RX ADMIN — DULOXETINE HYDROCHLORIDE 60 MG: 60 CAPSULE, DELAYED RELEASE ORAL at 04:49

## 2021-10-14 RX ADMIN — INSULIN HUMAN 1 UNITS: 100 INJECTION, SOLUTION PARENTERAL at 08:20

## 2021-10-14 RX ADMIN — INSULIN GLARGINE 80 UNITS: 100 INJECTION, SOLUTION SUBCUTANEOUS at 08:21

## 2021-10-14 RX ADMIN — CHOLECALCIFEROL TAB 125 MCG (5000 UNIT) 5000 UNITS: 125 TAB at 08:18

## 2021-10-14 RX ADMIN — Medication 400 MG: at 04:47

## 2021-10-14 ASSESSMENT — COGNITIVE AND FUNCTIONAL STATUS - GENERAL
MOVING TO AND FROM BED TO CHAIR: A LITTLE
MOBILITY SCORE: 22
SUGGESTED CMS G CODE MODIFIER DAILY ACTIVITY: CH
DAILY ACTIVITIY SCORE: 24
SUGGESTED CMS G CODE MODIFIER MOBILITY: CJ
MOVING FROM LYING ON BACK TO SITTING ON SIDE OF FLAT BED: A LITTLE

## 2021-10-14 ASSESSMENT — GAIT ASSESSMENTS
DEVIATION: BRADYKINETIC
GAIT LEVEL OF ASSIST: SUPERVISED
DISTANCE (FEET): 500

## 2021-10-14 ASSESSMENT — ACTIVITIES OF DAILY LIVING (ADL): TOILETING: INDEPENDENT

## 2021-10-14 ASSESSMENT — PAIN DESCRIPTION - PAIN TYPE
TYPE: SURGICAL PAIN
TYPE: SURGICAL PAIN

## 2021-10-14 NOTE — THERAPY
Physical Therapy   Initial Evaluation     Patient Name: Toney Watson Jr.  Age:  71 y.o., Sex:  male  Medical Record #: [id number]  Today's Date: 10/14/2021     Precautions  Precautions: Spinal / Back Precautions   Comments: no brace ordered    Assessment  Mr. Watson is a 72 y/o male who presents to acute secondary to laminotomy redo of L4-S1. Provided with handout and reviewed spinal precautions, pt demonstrated good recall from prior back surgeries. LE strength equal bilaterally and WFL. Able to perform gait, transfers, bed mobility, and stairs without physical assist. No additional acute PT needs. Recommend follow up with outpatient PT once cleared by MD.     Plan    Recommend Physical Therapy for Evaluation only     DC Equipment Recommendations: None  Discharge Recommendations: Recommend outpatient physical therapy services to address higher level deficits            Objective       10/14/21 0905   Prior Living Situation   Prior Services None   Housing / Facility 1 Story House   Steps Into Home 3   Equipment Owned Front-Wheel Walker;Single Point Cane;Wheelchair   Lives with - Patient's Self Care Capacity Spouse   Prior Level of Functional Mobility   Bed Mobility Independent   Transfer Status Independent   Ambulation Independent   Distance Ambulation (Feet)   (community ambulator)   Assistive Devices Used None   Stairs Independent   Cognition    Level of Consciousness Alert   Comments very pleasant and motivated   Passive ROM Lower Body   Passive ROM Lower Body WDL   Active ROM Lower Body    Active ROM Lower Body  WDL   Strength Lower Body   Lower Body Strength  WDL   Sensation Lower Body   Lower Extremity Sensation   WDL   Balance Assessment   Sitting Balance (Static) Good   Sitting Balance (Dynamic) Good   Standing Balance (Static) Good   Standing Balance (Dynamic) Good   Weight Shift Sitting Good   Weight Shift Standing Good   Comments no AD   Gait Analysis   Gait Level Of Assist Supervised   Assistive Device  None   Distance (Feet) 500   # of Times Distance was Traveled 1   Deviation Bradykinetic   # of Stairs Climbed 5   Level of Assist with Stairs Supervised   Weight Bearing Status no restrictions   Bed Mobility    Supine to Sit Supervised   Sit to Supine Supervised   Scooting Supervised   Functional Mobility   Sit to Stand Supervised

## 2021-10-14 NOTE — DISCHARGE INSTRUCTIONS
Discharge Instructions    Discharged to home by car with relative. Discharged via wheelchair, hospital escort: Yes.  Special equipment needed: Not Applicable    Be sure to schedule a follow-up appointment with your primary care doctor or any specialists as instructed.     Discharge Plan:        I understand that a diet low in cholesterol, fat, and sodium is recommended for good health. Unless I have been given specific instructions below for another diet, I accept this instruction as my diet prescription.   Other diet: Diabetic    Special Instructions:   Ok to shower.  Avoid repetitive lifting, pushing, pulling greater than 15 pounds. Follow up with [facility] in 2/6 weeks.  Call with questions.      Laminectomy - Laminotomy - Discectomy  Your surgeon has decided that a laminectomy (entire lamina removal) or laminotomy (partial lamina removal) is the best treatment for your back problem. These procedures involve removal of bone to relieve pressure on nerve roots. It allows the surgeon access to parts of the spine where other problems are located. This could be an injured disc (the cartilage-like structures located between the bones of the back). In this surgery your surgeon removes a part of the mell arch that surrounds your spinal canal. This may be compressing nerve roots. In some cases, the surgeon will remove the disc and fuse (stick together) vertebral bodies (the bones of your back) to make the spine more stable. The type of procedure you will need is usually decided prior to surgery, however modifications may be necessary. The time in surgery depends on the findings in surgery and the procedure necessary to correct the problems.  DISCECTOMY  For people with disc problems, the surgeon removes the portion of the disc that is causing the pressure on the nerve root. Some surgeons perform a micro (small) discectomy, which may require removal of only a small portion of the lamina. A disc nucleus (center) may also  be removed either through a needle (percutaneous discectomy) or by injecting an enzyme called chymopapain into the disc. Chymopapain is an enzyme that dissolves the disc. For people with back instability, the surgeon fuses vertebrae that are next to each other with tiny pieces of bone. These are used as bone grafts on the facets, or between the vertebrae. When this heals, the bones will no longer be able to move. These bone chips are often taken from the pelvic bones. Bones and bone grafts grow into one unit, stabilizing the segments of the spinal column.  LET YOUR CAREGIVER KNOW ABOUT:  · Allergies.  · Medicines taken including herbs, eyedrops, over-the-counter medicines, and creams.  · Use of steroids (by mouth or creams).  · Previous problems with anesthetics or numbing medicine.  · Possibility of pregnancy, if this applies.  · History of blood clots (thrombophlebitis).  · History of bleeding or blood problems.  · Previous surgery.  · Other health problems.  RISKS AND COMPLICATIONS  Your caregiver will discuss possible risks and complications with you before surgery. In addition to the usual risks of anesthesia, other common risks and complications include:  · Blood loss and replacement.  · Temporary increase in pain due to surgery.  · Uncorrected back pain.  · Infection.  · New nerve damage (tingling, numbness, and pain).  BEFORE THE PROCEDURE  · Stop smoking at least 1 week prior to surgery. This lowers risk during surgery.  · Your caregiver may advise that you stop taking certain medicines that may affect the outcome of the surgery and your ability to heal. For example, you may need to stop taking anti-inflammatories, such as aspirin, because of possible bleeding problems. Other medicines may have interactions with anesthesia.  · Tell your caregiver if you have been on steroids for long periods of time. Often, additional steroids are administered intravenously before and during the procedure to prevent  complications.  · You should be present 60 minutes prior to your procedure or as directed.  AFTER THE PROCEDURE  After surgery, you will be taken to the recovery area where a nurse will watch and check your progress. Generally, you will be allowed to go home within 1 week barring other problems.  HOME CARE INSTRUCTIONS   · Check the surgical cut (incision) twice a day for signs of infection. Some signs may include a bad smelling, greenish or yellowish discharge from the wound; increased pain or increased redness over the incision site; an opening of the incision; flu-like symptoms; or a temperature above 101.5° F (38.6° C).  · Change your bandages in about 24 to 36 hours following surgery or as directed.  · You may shower once the bandage is removed or as directed. Avoid bathtubs, swimming pools, and hot tubs for 3 weeks or until your incision has healed completely. If you have stitches (sutures) or staples they may be removed 2 to 3 weeks after surgery, or as directed by your caregiver.  · Follow your caregiver's instructions for activities, exercises, and physical therapy.  · Weight reduction may be beneficial if you are overweight.  · Walking is permitted. You may use a treadmill without an incline. Cut down on activities if you have discomfort. You may also go up and down stairs as tolerated.  · Do not lift anything heavier than 10 to 15 pounds. Avoid bending or twisting at the waist. Always bend your knees.  · Maintain strength and range of motion as instructed.  · No driving is permitted for 2 to 3 weeks, or as directed by your caregiver. You may be a passenger for 20 to 30 minute trips. Lying back in the passenger seat may be more comfortable for you.  · Limit your sitting to 20 to 30 minute intervals. You should lie down or walk in between sitting periods. There are no limitations for sitting in a recliner chair.  · Only take over-the-counter or prescription medicines for pain, discomfort, or fever as  directed by your caregiver.  SEEK MEDICAL CARE IF:   · There is increased bleeding (more than a small spot) from the wound.  · You notice redness, swelling, or increasing pain in the wound.  · Pus is coming from the wound.  · An unexplained oral temperature above 102° F (38.9° C) develops.  · You notice a bad smell coming from the wound or dressing.  SEEK IMMEDIATE MEDICAL CARE IF:   · You develop a rash.  · You have difficulty breathing.  · You have any allergic problems.  Document Released: 12/15/2001 Document Revised: 03/11/2013 Document Reviewed: 10/13/2009  ExitCare® Patient Information ©2014 [employer].      · Is patient discharged on Warfarin / Coumadin?   No     Depression / Suicide Risk    As you are discharged from this [facility] facility, it is important to learn how to keep safe from harming yourself.    Recognize the warning signs:  · Abrupt changes in personality, positive or negative- including increase in energy   · Giving away possessions  · Change in eating patterns- significant weight changes-  positive or negative  · Change in sleeping patterns- unable to sleep or sleeping all the time   · Unwillingness or inability to communicate  · Depression  · Unusual sadness, discouragement and loneliness  · Talk of wanting to die  · Neglect of personal appearance   · Rebelliousness- reckless behavior  · Withdrawal from people/activities they love  · Confusion- inability to concentrate     If you or a loved one observes any of these behaviors or has concerns about self-harm, here's what you can do:  · Talk about it- your feelings and reasons for harming yourself  · Remove any means that you might use to hurt yourself (examples: pills, rope, extension cords, firearm)  · Get professional help from the community (Mental Health, Substance Abuse, psychological counseling)  · Do not be alone:Call your Safe Contact- someone whom you trust who will be there for you.  · Call your local CRISIS HOTLINE 594-7824  or 049-628-2329  · Call your local Children's Mobile Crisis Response Team [place] (257) 705-3572 or www.[employer]  · Call the toll free National Suicide Prevention Hotlines   · National Suicide Prevention Lifeline 482-057-CMMB (4166)  · National [employer] Line Network 800-SUICIDE (291-3093)

## 2021-10-14 NOTE — PROGRESS NOTES
Neurosurgery Progress Note    Subjective:  POD#1 redo right L4-S1 laminotomies.  Doing well, pain controlled.  Mobilizing fine.  Drain with minimal output.     Exam:  Motor 5/5.     BP  Min: 100/53  Max: 155/70  Pulse  Av.8  Min: 57  Max: 79  Resp  Av  Min: 12  Max: 33  Temp  Av.2 °C (97.1 °F)  Min: 35.9 °C (96.6 °F)  Max: 36.5 °C (97.7 °F)  SpO2  Av.5 %  Min: 92 %  Max: 97 %    No data recorded                      Intake/Output                       10/13/21 07 - 10/14/21 0659 10/14/21 0700 - 10/15/21 0659      Total 1900-0659 Total                 Intake    I.V.  1100  -- 1100  --  -- --    Volume (mL) (lactated ringers infusion) 1100 -- 1100 -- -- --    Total Intake 1100 -- 1100 -- -- --       Output    Urine  600  750 1350  --  -- --    Number of Times Voided -- 2 x 2 x -- -- --    Urine Void (mL)  -- -- --    Drains  --  125 125  10  -- 10    Output (mL) (Closed/Suction Drain 1 Posterior Hemovac) -- 125 125 10 -- 10    Blood  50  -- 50  --  -- --    Est. Blood Loss 50 -- 50 -- -- --    Total Output  10 -- 10       Net I/O     450 -875 -425 -10 -- -10            Intake/Output Summary (Last 24 hours) at 10/14/2021 1053  Last data filed at 10/14/2021 0805  Gross per 24 hour   Intake --   Output 1485 ml   Net -1485 ml            • amLODIPine  2.5 mg Q EVENING   • atorvastatin  80 mg Nightly   • carvedilol  25 mg BID WITH MEALS   • digoxin  125 mcg Daily-1400   • DULoxetine  60 mg DAILY   • gabapentin  900 mg BID   • artificial tears  1 Drop PRN   • insulin glargine  80 Units BID   • lisinopril  40 mg DAILY   • magnesium oxide  400 mg BID   • metformin  1,000 mg BID WITH MEALS   • zolpidem  5 mg HS PRN   • Pharmacy Consult Request  1 Each PHARMACY TO DOSE   • MD ALERT...DO NOT ADMINISTER NSAIDS or ASPIRIN unless ORDERED By Neurosurgery  1 Each PRN   • docusate sodium  100 mg BID   • senna-docusate  1 Tablet Nightly   • senna-docusate  1  Tablet Q24HRS PRN   • polyethylene glycol/lytes  1 Packet BID PRN   • magnesium hydroxide  30 mL QDAY PRN   • bisacodyl  10 mg Q24HRS PRN   • sodium phosphate  1 Each Once PRN   • NS   Continuous   • enoxaparin  40 mg DAILY   • oxyCODONE immediate-release  5 mg Q3HRS PRN    Or   • oxyCODONE immediate-release  10 mg Q3HRS PRN    Or   • HYDROmorphone  0.5 mg Q3HRS PRN   • diphenhydrAMINE  25 mg Q6HRS PRN    Or   • diphenhydrAMINE  25 mg Q6HRS PRN   • ondansetron  4 mg Q4HRS PRN   • ondansetron  4 mg Q4HRS PRN   • cyclobenzaprine  10 mg Q8HRS PRN   • hydrALAZINE  10 mg Q HOUR PRN   • benzocaine-menthol  1 Lozenge Q2HRS PRN   • calcium carbonate  500 mg BID   • vitamin D3  5,000 Units DAILY   • omeprazole  20 mg Q EVENING   • insulin regular  20 Units TID AC   • insulin regular  0-6 Units 4X/DAY ACHS       Assessment and Plan:  Hospital day #2  POD #1  D/c drain.  D/c home.   Prophylactic anticoagulation: yes         Start date/time: today.

## 2021-10-14 NOTE — THERAPY
"Occupational Therapy   Initial Evaluation     Patient Name: Toney Watson Jr.  Age:  71 y.o., Sex:  male  Medical Record #: [id number]  Today's Date: 10/14/2021     Precautions: Spinal / Back Precautions   Comments: no brace ordered    Assessment  Patient is 71 y.o. male s/p redo right L4-S1 laminotomies, reviewed post-op precautions and provided education on compensatory strategies for ADLs. Post-op packet provided for reference of education. Pt reports all DME is available at home, his spouse is able to assist with light activity but is limited by RA. Pt reports familiarity with lumbar surgery recovery process and states no additional questions or concerns regarding safe DC home once medically cleared.     Plan    Recommend Occupational Therapy for Evaluation only     DC Equipment Recommendations: None  Discharge Recommendations: Anticipate that the patient will have no further occupational therapy needs after discharge from the hospital     Subjective    \"I have all the safety equipment imaginable.\"      Objective     10/14/21 0904   Prior Living Situation   Prior Services None   Housing / Facility 1 Story House   Steps Into Home 3   Bathroom Set up Bathtub / Shower Combination;Walk In Shower   Equipment Owned Front-Wheel Walker;Single Point Cane;Wheelchair;Grab Bar(s) In Tub / Shower;Grab Bar(s) By Toilet;Tub / Shower Seat;Tub Transfer Bench;Raised Toilet Seat Without Arms;Raised Toilet Seat With Arms;Sock Aid;Reacher   Lives with - Patient's Self Care Capacity Spouse   Comments wife has severe RA, pt has all DME    Prior Level of ADL Function   Comments independent   Prior Level of IADL Function   Comments independent   Pain 0 - 10 Group   Therapist Pain Assessment Post Activity Pain Same as Prior to Activity;Nurse Notified  (tolerable)   Balance Assessment   Weight Shift Sitting Good   Weight Shift Standing Good   Comments no AD   Bed Mobility    Supine to Sit Supervised   Sit to Supine Supervised   Scooting " Supervised   ADL Assessment   Eating Modified Independent   Grooming Modified Independent;Standing   Upper Body Dressing Modified Independent   Lower Body Dressing Supervision   Toileting Supervision   Functional Mobility   Sit to Stand Supervised   Bed, Chair, Wheelchair Transfer Supervised   Transfer Method Stand Step   Mobility no AD   Activity Tolerance   Sitting in Chair functional   Sitting Edge of Bed functional   Standing functional   Problem List   Problem List None

## 2021-10-14 NOTE — DIETARY
NUTRITION SERVICES: BMI - Pt with BMI >40 (=Body mass index is 43.21 kg/m².), Class III obesity. Weight loss counseling not appropriate in acute care setting.  RECOMMEND - If appropriate at DC please refer to outpatient services for weight management.

## 2021-10-14 NOTE — DISCHARGE PLANNING
Anticipated Discharge Disposition: Home    Action: Pt has been cleared for discharge to home.    Barriers to Discharge: No discharge needs identified.    Plan: Discharge to home.

## 2021-10-14 NOTE — PROGRESS NOTES
Patient up to bathroom and ambulating, denies pain.  Hemovac and PIV removed.  Reviewed discharge instructions.  No questions, waiting for ride home    Covid 19 surge in effect

## 2021-10-14 NOTE — DISCHARGE SUMMARY
DATE OF ADMISSION:  10/13/2021   DATE OF DISCHARGE:  10/14/2021     DISCHARGE DIAGNOSES:  1.  Low back pain.  2.  Right lower extremity pain.     SURGERY PERFORMED:  Redo right L4 through S1 laminotomies with foraminotomies   with posterolateral arthrodesis at L4-5, utilizing local autograft performed   by Dr. Alberto Aguirre.     COMPLICATIONS:  None.     REASON FOR ADMISSION:  This is a very pleasant 71-year-old male who is well   known to Dr. Aguirre as he did undergo prior L4-5 transforaminal lumbar   interbody fusion approximately one year ago by Dr. Aguirre.  He did well   following this, but then had some recurrence of right lower extremity pain   that began several months ago.  This was refractory to conservative treatments   including epidural steroid injections and physical therapy.  His preoperative   workup showed collapse of the disk space at L4/5 with some right foraminal   stenosis at L4-5 and L5-S1 and the patient was hereby indicated for the above   surgery.     HOSPITAL COURSE:  The patient was admitted on the date of surgery.  He   underwent the above surgery without complication, was moved to the orthopedic   floor.  Here in the orthopedic floor, he has made a good recovery   postoperatively.  On postoperative day #1, his pain is controlled with oral   medications.  He is ambulating.  He is voiding.  He is tolerating oral food   and medications well.  His motor exam is 5/5.  His drain has minimal output   and is hereby cleared for discharge home under stable condition after an   uneventful hospital stay.     DISCHARGE INSTRUCTIONS:  The patient is to eat a normal diet as tolerated.  He   is to walk as much as tolerated.  He is to avoid repetitive bending at the   waist.  He is to avoid lifting, pushing, pulling greater than 15 pounds.  It   would be okay for the patient to drive in 2 weeks' time or when he is   comfortable not taking narcotic pain medications.  He should take an    over-the-counter stool softener while taking narcotic pain medications.  He   should ice his incision for 10-15 minutes multiple times per day.  He does   have followup appointments in the office of [facility] in 2 and 6 weeks'   time.  He is to keep those appointments.  He should call our office or go to   the nearest emergency department with any emergent changes.  The patient was   given these discharge instructions verbally and he voiced understanding of   them.     DISCHARGE MEDICATIONS:  The patient has prescriptions for pain medicines and   muscle relaxers already, so he is not prescribed any medications at discharge   today.        ______________________________  OZZY DEL ROSARIO PA-C        ______________________________  MD NURY Mendoza/JASON    DD:  10/14/2021 10:58  DT:  10/14/2021 11:14    Job#:  [id number]    CC:Vikash Subramanian MD

## 2021-10-14 NOTE — PROGRESS NOTES
Covid 19 surge in effect    Pain score 0, continues IV Abx.     Mobilizing to toilet with standby assist.     Walked 100 feet overnight x1 standby.

## (undated) DEVICE — SUTURE 0 VICRYL PLUS CT-1 - 8 X 18 INCH (12/BX)

## (undated) DEVICE — TOOL DISSECT MATCH HEAD

## (undated) DEVICE — SET EXTENSION WITH 2 PORTS (48EA/CA) ***PART #2C8610 IS A SUBSTITUTE*****

## (undated) DEVICE — ELECTRODE DUAL RETURN W/ CORD - (50/PK)

## (undated) DEVICE — KIT ANESTHESIA W/CIRCUIT & 3/LT BAG W/FILTER (20EA/CA)

## (undated) DEVICE — ELECTRODE 850 FOAM ADHESIVE - HYDROGEL RADIOTRNSPRNT (50/PK)

## (undated) DEVICE — GLOVE BIOGEL INDICATOR SZ 7.5 SURGICAL PF LTX - (50PR/BX 4BX/CA)

## (undated) DEVICE — HEADREST PRONEVIEW LARGE - (10/CA)

## (undated) DEVICE — DRAPE STRLE REG TOWEL 18X24 - (10/BX 4BX/CA)"

## (undated) DEVICE — LIGHT SOURCE MIS 12FT

## (undated) DEVICE — SUCTION INSTRUMENT YANKAUER BULBOUS TIP W/O VENT (50EA/CA)

## (undated) DEVICE — DRESSING TRANSPARENT FILM TEGADERM 4 X 4.75" (50EA/BX)"

## (undated) DEVICE — TUBING CLEARLINK DUO-VENT - C-FLO (48EA/CA)

## (undated) DEVICE — GLOVE SZ 8 BIOGEL PI MICRO - PF LF (50PR/BX)

## (undated) DEVICE — SET LEADWIRE 5 LEAD BEDSIDE DISPOSABLE ECG (1SET OF 5/EA)

## (undated) DEVICE — SENSOR SPO2 NEO LNCS ADHESIVE (20/BX) SEE USER NOTES

## (undated) DEVICE — PAD BABY LAP 4X18 W/O - RINGS PREWASHED 5/PK 40PK/CS

## (undated) DEVICE — BONE MILL BM210

## (undated) DEVICE — GLOVE BIOGEL PI INDICATOR SZ 6.5 SURGICAL PF LF - (50/BX 4BX/CA)

## (undated) DEVICE — NEEDLE #2 CANNULATED PEDIGUARD

## (undated) DEVICE — K-WIRE

## (undated) DEVICE — SUTURE 2-0 VICRYL PLUS CT-1 - 8 X 18 INCH(12/BX)

## (undated) DEVICE — MASK ANESTHESIA ADULT  - (100/CA)

## (undated) DEVICE — SUTURE GENERAL

## (undated) DEVICE — SLEEVE, VASO, THIGH, MED

## (undated) DEVICE — KIT SURGIFLO W/OUT THROMBIN - (6EA/CA)

## (undated) DEVICE — PADDING CAST 4 IN X 4 YDS - SOF-ROLL (12RL/BG 6BG/CT)

## (undated) DEVICE — GLOVE BIOGEL SZ 7 SURGICAL PF LTX - (50PR/BX 4BX/CA)

## (undated) DEVICE — GLOVE SZ 6.5 BIOGEL PI MICRO - PF LF (50PR/BX)

## (undated) DEVICE — GOWN WARMING STANDARD FLEX - (30/CA)

## (undated) DEVICE — DRAPE MICROSCOPE ARMATEC 120IN X 46IN (10EA/CA)

## (undated) DEVICE — GLOVE BIOGEL M SZ 8 SURGICAL PF LTX - (50/BX 4BX/CA)

## (undated) DEVICE — LACTATED RINGERS INJ 1000 ML - (14EA/CA 60CA/PF)

## (undated) DEVICE — SUTURE 2-0 VICRYL PLUS SH - 27 INCH (36/BX)

## (undated) DEVICE — TOWEL STOP TIMEOUT SAFETY FLAG (40EA/CA)

## (undated) DEVICE — MIDAS LUBRICATOR DIFFUSER PACK (4EA/CA)

## (undated) DEVICE — DRAIN J-P FLAT 7MM X 20CM - (10/CA)

## (undated) DEVICE — SPONGE GAUZESTER 4 X 4 4PLY - (128PK/CA)

## (undated) DEVICE — Device

## (undated) DEVICE — CANISTER SUCTION 3000ML MECHANICAL FILTER AUTO SHUTOFF MEDI-VAC NONSTERILE LF DISP  (40EA/CA)

## (undated) DEVICE — DRAPE MAYO STAND - (30/CA)

## (undated) DEVICE — SUTURE 4-0 ETHILON PS-2 18 (12PK/BX)"

## (undated) DEVICE — GLOVE, BIOGEL ECLIPSE, SZ 7.0, PF LTX (50/BX)

## (undated) DEVICE — HOOP SHIM

## (undated) DEVICE — SYRINGE NON SAFETY 10 CC 20 GA X 1-1/2 IN (100/BX 4BX/CA)

## (undated) DEVICE — PACK UPPER EXTREMITY SM OR - (3/CA)

## (undated) DEVICE — PROTECTOR ULNA NERVE - (36PR/CA)

## (undated) DEVICE — ARMREST CRADLE FOAM - (2PR/PK 12PR/CA)

## (undated) DEVICE — HANDLE CANNULATED PEDIGUARD

## (undated) DEVICE — CHLORAPREP 26 ML APPLICATOR - ORANGE TINT(25/CA)

## (undated) DEVICE — BLADE BEAVER 6400 MINI EYE ROUND TIP SHARP ON ONE SIDE (20/CA)

## (undated) DEVICE — NEPTUNE 4 PORT MANIFOLD - (20/PK)

## (undated) DEVICE — PACK NEURO - (2EA/CA)

## (undated) DEVICE — BLADE SURGICAL CLIPPER - (50EA/CA)

## (undated) DEVICE — DRAPE LAPAROTOMY T SHEET - (12EA/CA)

## (undated) DEVICE — TUBING C&T SET FLYING LEADS DRAIN TUBING (10EA/BX)

## (undated) DEVICE — SODIUM CHL IRRIGATION 0.9% 1000ML (12EA/CA)

## (undated) DEVICE — SYRINGE SAFETY 10 ML 18 GA X 1 1/2 BLUNT LL (100/BX 4BX/CA)

## (undated) DEVICE — DISSECT TOOL MIDAS

## (undated) DEVICE — DRAPE FLUOROSCAN C-ARM - 54 X 78 (40EA/CA)

## (undated) DEVICE — GOWN SURGEONS LARGE - (32/CA)

## (undated) DEVICE — CLOSURE SKIN STRIP 1/2 X 4 IN - (STERI STRIP) (50/BX 4BX/CA)

## (undated) DEVICE — TOURNIQUET CUFF 18 X 3 ONE PORT DISP - STERILE (10/BX)

## (undated) DEVICE — BUR 702 1.6 (ORAL)

## (undated) DEVICE — PACK JACKSON TABLE KIT W/OUT - HR (6EA/CA)

## (undated) DEVICE — KIT EVACUATER 3 SPRING PVC LF 1/8 DRAIN SIZE (10EA/CA)"

## (undated) DEVICE — DIGIT TRAP (20PR/BX)

## (undated) DEVICE — GLOVE BIOGEL INDICATOR SZ 8 SURGICAL PF LTX - (50/BX 4BX/CA)

## (undated) DEVICE — LACTATED RINGERS INJ. 500 ML - (24EA/CA)

## (undated) DEVICE — BANDAGE ELASTIC LATEX STERILE VELCRO 4 X 5 YDS (25EA/CA)

## (undated) DEVICE — SPLINT PLASTER 4 IN  X 15 IN - (50/BX 12BX/CA)